# Patient Record
Sex: FEMALE | Race: WHITE | NOT HISPANIC OR LATINO | Employment: PART TIME | URBAN - NONMETROPOLITAN AREA
[De-identification: names, ages, dates, MRNs, and addresses within clinical notes are randomized per-mention and may not be internally consistent; named-entity substitution may affect disease eponyms.]

---

## 2018-10-04 ENCOUNTER — HOSPITAL ENCOUNTER (OUTPATIENT)
Facility: MEDICAL CENTER | Age: 28
End: 2018-10-04
Attending: NURSE PRACTITIONER
Payer: COMMERCIAL

## 2018-10-04 ENCOUNTER — OCCUPATIONAL MEDICINE (OUTPATIENT)
Dept: URGENT CARE | Facility: PHYSICIAN GROUP | Age: 28
End: 2018-10-04

## 2018-10-04 VITALS
TEMPERATURE: 97.2 F | WEIGHT: 207 LBS | HEART RATE: 64 BPM | OXYGEN SATURATION: 100 % | RESPIRATION RATE: 14 BRPM | DIASTOLIC BLOOD PRESSURE: 68 MMHG | HEIGHT: 62 IN | SYSTOLIC BLOOD PRESSURE: 108 MMHG | BODY MASS INDEX: 38.09 KG/M2

## 2018-10-04 DIAGNOSIS — Z02.89 ENCOUNTER FOR PHYSICAL EXAMINATION RELATED TO EMPLOYMENT: ICD-10-CM

## 2018-10-04 DIAGNOSIS — Z02.1 PRE-EMPLOYMENT DRUG SCREENING: ICD-10-CM

## 2018-10-04 LAB
AMP AMPHETAMINE: NORMAL
COC COCAINE: NORMAL
INT CON NEG: NORMAL
INT CON POS: NORMAL
MET METHAMPHETAMINES: NORMAL
OPI OPIATES: NORMAL
PCP PHENCYCLIDINE: NORMAL
POC DRUG COMMENT 753798-OCCUPATIONAL HEALTH: NORMAL
THC: NORMAL

## 2018-10-04 PROCEDURE — 80305 DRUG TEST PRSMV DIR OPT OBS: CPT | Performed by: NURSE PRACTITIONER

## 2018-10-04 PROCEDURE — 8915 PR COMPREHENSIVE PHYSICAL: Performed by: NURSE PRACTITIONER

## 2018-10-04 RX ORDER — VALACYCLOVIR HYDROCHLORIDE 500 MG/1
500 TABLET, FILM COATED ORAL 2 TIMES DAILY
COMMUNITY
End: 2020-09-18 | Stop reason: SDUPTHER

## 2018-10-04 NOTE — PROGRESS NOTES
"Subjective:      Christina Wooten is a 28 y.o. female who presents with Employment Physical (Px, UDS, TB Blood)            HPI    ROS  Pre employment physical     Objective:     /68 (BP Location: Right arm, Patient Position: Sitting, BP Cuff Size: Adult)   Pulse 64   Temp 36.2 °C (97.2 °F) (Temporal)   Resp 14   Ht 1.575 m (5' 2\")   Wt 93.9 kg (207 lb)   SpO2 100%   BMI 37.86 kg/m²      Physical Exam            Assessment/Plan:     1. Encounter for physical examination related to employment  Qu[employer]ON-TB Gold [TB TEST CELL IMM MEASURE AG]   2. Pre-employment drug screening  POCT 6 Panel Urine Drug Screen       "

## 2018-10-06 LAB
M TB TUBERC IFN-G BLD QL: NEGATIVE
M TB TUBERC IFN-G/MITOGEN IGNF BLD: -0
M TB TUBERC IGNF/MITOGEN IGNF CONTROL: 54.72 [IU]/ML
MITOGEN IGNF BCKGRD COR BLD-ACNC: 0.13 [IU]/ML

## 2020-09-18 ENCOUNTER — OFFICE VISIT (OUTPATIENT)
Dept: MEDICAL GROUP | Facility: PHYSICIAN GROUP | Age: 30
End: 2020-09-18
Payer: COMMERCIAL

## 2020-09-18 VITALS
HEIGHT: 62 IN | SYSTOLIC BLOOD PRESSURE: 110 MMHG | DIASTOLIC BLOOD PRESSURE: 76 MMHG | RESPIRATION RATE: 16 BRPM | BODY MASS INDEX: 42.65 KG/M2 | OXYGEN SATURATION: 95 % | TEMPERATURE: 97.5 F | WEIGHT: 231.8 LBS | HEART RATE: 98 BPM

## 2020-09-18 DIAGNOSIS — F41.9 ANXIETY: ICD-10-CM

## 2020-09-18 DIAGNOSIS — A60.00 GENITAL HERPES SIMPLEX, UNSPECIFIED SITE: ICD-10-CM

## 2020-09-18 DIAGNOSIS — E04.9 ENLARGED THYROID: ICD-10-CM

## 2020-09-18 DIAGNOSIS — J30.2 SEASONAL ALLERGIES: ICD-10-CM

## 2020-09-18 PROCEDURE — 99204 OFFICE O/P NEW MOD 45 MIN: CPT | Performed by: NURSE PRACTITIONER

## 2020-09-18 RX ORDER — PROPRANOLOL HYDROCHLORIDE 20 MG/1
20 TABLET ORAL 2 TIMES DAILY PRN
Qty: 30 TAB | Refills: 5 | Status: SHIPPED | OUTPATIENT
Start: 2020-09-18 | End: 2023-04-07

## 2020-09-18 RX ORDER — BUPROPION HYDROCHLORIDE 150 MG/1
150 TABLET ORAL EVERY MORNING
Qty: 30 TAB | Refills: 0 | Status: SHIPPED | OUTPATIENT
Start: 2020-09-18 | End: 2021-02-17

## 2020-09-18 RX ORDER — VALACYCLOVIR HYDROCHLORIDE 500 MG/1
TABLET, FILM COATED ORAL
Qty: 30 TAB | Refills: 1 | Status: SHIPPED | OUTPATIENT
Start: 2020-09-18 | End: 2021-02-17 | Stop reason: SDUPTHER

## 2020-09-18 RX ORDER — BUPROPION HYDROCHLORIDE 300 MG/1
300 TABLET ORAL EVERY MORNING
COMMUNITY
End: 2020-09-18

## 2020-09-18 SDOH — HEALTH STABILITY: MENTAL HEALTH: HOW OFTEN DO YOU HAVE 6 OR MORE DRINKS ON ONE OCCASION?: LESS THAN MONTHLY

## 2020-09-18 SDOH — HEALTH STABILITY: MENTAL HEALTH: HOW MANY STANDARD DRINKS CONTAINING ALCOHOL DO YOU HAVE ON A TYPICAL DAY?: 1 OR 2

## 2020-09-18 SDOH — HEALTH STABILITY: MENTAL HEALTH: HOW OFTEN DO YOU HAVE A DRINK CONTAINING ALCOHOL?: 2-4 TIMES A MONTH

## 2020-09-18 ASSESSMENT — PATIENT HEALTH QUESTIONNAIRE - PHQ9
5. POOR APPETITE OR OVEREATING: 2 - MORE THAN HALF THE DAYS
CLINICAL INTERPRETATION OF PHQ2 SCORE: 5
SUM OF ALL RESPONSES TO PHQ QUESTIONS 1-9: 16

## 2020-09-18 NOTE — PATIENT INSTRUCTIONS
Taper of buproprion    Start sertraline once a day    Take propranolol as needed 1/2 hour prior to anxiety provoking situation

## 2020-09-18 NOTE — ASSESSMENT & PLAN NOTE
Patient is 30-year-old female here to establish care with me today.  Reports seasonal allergies.  Takes Singulair daily.

## 2020-09-18 NOTE — ASSESSMENT & PLAN NOTE
Patient is 30-year-old female here to establish care with me today.  Last flare was 3 months ago.  Takes acyclovir 500 mg twice a day for flares.  Requesting refill today.

## 2020-09-18 NOTE — ASSESSMENT & PLAN NOTE
Patient is 30-year-old female here to establish care with me today.  Taking bupropion for anxiety, which has not helped.  Patient works at [employer] and find a psychiatrist there mention that bupropion is not for anxiety.  Patient would like to trial a different medication such as sertraline.  Tried Celexa many years ago, which did not help.  Also having anxiety episodes and presenting projects at school.  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  She is wondering about prescription for propranolol.  She has not taken this in the past.

## 2020-09-19 NOTE — ASSESSMENT & PLAN NOTE
Patient is 30-year-old female here to establish care with me today.  Enlarged thyroid noted on exam today.  Patient reports last thyroid labs were normal.  Has never had ultrasound of thyroid.  Family history of thyroid disease.

## 2020-09-19 NOTE — PROGRESS NOTES
CC: Establish care, medication for anxiety    HISTORY OF THE PRESENT ILLNESS: Patient is a 30 y.o. female. This pleasant patient is here today for evaluation and management of the following health problems.  Patient's previous primary care provider is Dr. Smith at [place].  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  Works full-time at [employer].      Seasonal allergies  Patient is 30-year-old female here to establish care with me today.  Reports seasonal allergies.  Takes Singulair daily.    Genital herpes  Patient is 30-year-old female here to establish care with me today.  Last flare was 3 months ago.  Takes acyclovir 500 mg twice a day for flares.  Requesting refill today.        Anxiety  Patient is 30-year-old female here to establish care with me today.  Taking bupropion for anxiety, which has not helped.  Patient works at [employer] and find a psychiatrist there mention that bupropion is not for anxiety.  Patient would like to trial a different medication such as sertraline.  Tried Celexa many years ago, which did not help.  Also having anxiety episodes and presenting projects at school.  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  She is wondering about prescription for propranolol.  She has not taken this in the past.    Enlarged thyroid  Patient is 30-year-old female here to establish care with me today.  Enlarged thyroid noted on exam today.  Patient reports last thyroid labs were normal.  Has never had ultrasound of thyroid.  Family history of thyroid disease.      Allergies: Vicodin [hydrocodone-acetaminophen]    Current Outpatient Medications Ordered in Epic   Medication Sig Dispense Refill   • valACYclovir (VALTREX) 500 MG Tab Take 1 tab twice a day for 3 days if needed for herpes flare. 30 Tab 1   • sertraline (ZOLOFT) 50 MG Tab Take 1 Tab by mouth every day. 30 Tab 2   • propranolol (INDERAL) 20 MG Tab Take 1 Tab by mouth 2 times a day as needed. 30 Tab 5   •  buPROPion (WELLBUTRIN XL) 150 MG XL tablet Take 1 Tab by mouth every morning. 30 Tab 0   • levonorgestrel (MIRENA, 52 MG,) 20 MCG/24HR IUD 1 Each by Intrauterine route Once.     • MONTELUKAST SODIUM PO Take  by mouth.       No current Epic-ordered facility-administered medications on file.        Past Medical History:   Diagnosis Date   • Allergy    • Anxiety    • Depression    • GERD (gastroesophageal reflux disease)    • Head ache    • Urinary tract infection        Past Surgical History:   Procedure Laterality Date   • DENTAL EXTRACTION(S)         Social History     Tobacco Use   • Smoking status: Never Smoker   • Smokeless tobacco: Never Used   Substance Use Topics   • Alcohol use: Yes     Frequency: 2-4 times a month     Drinks per session: 1 or 2     Binge frequency: Less than monthly   • Drug use: Never       Family History   Problem Relation Age of Onset   • Arthritis Mother    • Cancer Mother         uterine   • Heart Disease Mother    • Hypertension Mother    • Drug abuse Mother    • Alcohol abuse Father    • Scoliosis Sister    • Thyroid Maternal Grandmother    • Thyroid Sister         hashimoto       ROS:     - Constitutional: Negative for fever, chills, unexpected weight change.  Positive fatigue/generalized weakness.     - HEENT: Negative for vision changes, hearing changes, ear pain, rhinorrhea, sinus congestion, and sore throat.   Positive headaches    - Respiratory: Negative for cough, dyspnea, and wheezing.      - Cardiovascular: Negative for chest pain, orthopnea, and bilateral lower extremity edema.  Positive palpitations    - Gastrointestinal: Negative for nausea, vomiting, abdominal pain, hematochezia, melena, constipation. Positive heartburn and diarrhea.    - Genitourinary: Negative for dysuria, polyuria, hematuria, pyuria, urinary urgency, and urinary incontinence.      - Musculoskeletal: Negative for myalgias.  Positive back and joint pain.    - Skin: Negative for rash, itching, cyanotic  "skin color change.     - Neurological: Negative for dizziness, tingling, tremors, focal sensory deficit, focal weakness and headaches.     - Endo/Heme/Allergies: Positive bruise/bleed easily.     - Psychiatric/Behavioral: As in HPI, otherwise negative for  suicidal/homicidal ideation and memory loss.           Exam: /76   Pulse 98   Temp 36.4 °C (97.5 °F) (Temporal)   Resp 16   Ht 1.575 m (5' 2\")   Wt 105.1 kg (231 lb 12.8 oz)   SpO2 95%  Body mass index is 42.4 kg/m².    General: Alert, pleasant, obese habitus, well nourished, well developed female in NAD  HEENT: Normocephalic. Eyes conjunctiva clear lids without ptosis, pupils equal and reactive to light, ears normal shape and contour, canals are clear bilaterally, tympanic membranes are pearly gray with good light reflex, nasal mucosa without erythema and drainage, oropharynx is without erythema, edema or exudates.   Neck: Supple without bruit. Thyroid is enlarged.  Pulmonary: Clear to ausculation.  Normal effort. No rales, ronchi, or wheezing.  Cardiovascular: Normal rate and rhythm without murmur. Carotid and radial pulses are intact and equal bilaterally.  No lower extremity edema.  Abdomen: Soft, nontender, nondistended. Normal bowel sounds. Liver and spleen are not palpable  Neurologic: Grossly nonfocal  Lymph: No cervical or supraclavicular lymph nodes are palpable  Skin: Warm and dry.  No obvious lesions.  Musculoskeletal: Normal gait.   Psych: Normal mood and affect. Alert and oriented. Judgment and insight is normal.    Please note that this dictation was created using voice recognition software. I have made every reasonable attempt to correct obvious errors, but I expect that there are errors of grammar and possibly content that I did not discover before finalizing the note.      Assessment/Plan  1. Seasonal allergies  Well-controlled.  Continue Singulair.  Not needing refill at this time.    2. Genital herpes simplex, unspecified site  Has " 1-2 flares a year, relieved with valacyclovir.  Patient requesting refill today.  - valACYclovir (VALTREX) 500 MG Tab; Take 1 tab twice a day for 3 days if needed for herpes flare.  Dispense: 30 Tab; Refill: 1    3. Anxiety  Anxiety not controlled with bupropion, as best we can actually cause worsening anxiety.  Will taper off bupropion and start sertraline.  We reviewed that medication takes 4 to 6 weeks to reach full effect, and that side effects of headache and nausea are temporary lasting about 2 weeks.    Will prescribe propranolol to take before anxiety provoking situations.  Advised on routine aerobic exercise.    - sertraline (ZOLOFT) 50 MG Tab; Take 1 Tab by mouth every day.  Dispense: 30 Tab; Refill: 2  - propranolol (INDERAL) 20 MG Tab; Take 1 Tab by mouth 2 times a day as needed.  Dispense: 30 Tab; Refill: 5  - buPROPion (WELLBUTRIN XL) 150 MG XL tablet; Take 1 Tab by mouth every morning.  Dispense: 30 Tab; Refill: 0    4. Enlarged thyroid  We will get lab results from [facility].  Consider repeating TSH and adding TPO and free T4.  Consider ultrasound.    We will request lab results from [facility].  Patient will return to clinic in 6 weeks for follow-up on depression.

## 2020-10-23 ENCOUNTER — TELEPHONE (OUTPATIENT)
Dept: MEDICAL GROUP | Facility: PHYSICIAN GROUP | Age: 30
End: 2020-10-23

## 2020-10-23 DIAGNOSIS — J30.2 SEASONAL ALLERGIES: ICD-10-CM

## 2020-10-23 NOTE — TELEPHONE ENCOUNTER
[facility] patient is request a refill for montelukast which has not been written by any providers at this office. Will forward

## 2020-10-25 RX ORDER — MONTELUKAST SODIUM 10 MG/1
10 TABLET ORAL DAILY
Qty: 90 TAB | Refills: 3 | Status: SHIPPED | OUTPATIENT
Start: 2020-10-25 | End: 2021-08-10 | Stop reason: SDUPTHER

## 2020-11-10 ENCOUNTER — APPOINTMENT (OUTPATIENT)
Dept: URGENT CARE | Facility: PHYSICIAN GROUP | Age: 30
End: 2020-11-10

## 2020-12-24 DIAGNOSIS — F41.9 ANXIETY: ICD-10-CM

## 2021-01-06 ENCOUNTER — HOSPITAL ENCOUNTER (OUTPATIENT)
Dept: LAB | Facility: MEDICAL CENTER | Age: 31
End: 2021-01-06
Attending: NURSE PRACTITIONER
Payer: COMMERCIAL

## 2021-01-06 ENCOUNTER — OFFICE VISIT (OUTPATIENT)
Dept: MEDICAL GROUP | Facility: PHYSICIAN GROUP | Age: 31
End: 2021-01-06
Payer: COMMERCIAL

## 2021-01-06 VITALS
BODY MASS INDEX: 45.08 KG/M2 | DIASTOLIC BLOOD PRESSURE: 78 MMHG | RESPIRATION RATE: 18 BRPM | HEART RATE: 96 BPM | WEIGHT: 245 LBS | TEMPERATURE: 98.2 F | SYSTOLIC BLOOD PRESSURE: 112 MMHG | HEIGHT: 62 IN | OXYGEN SATURATION: 95 %

## 2021-01-06 DIAGNOSIS — F33.1 MODERATE EPISODE OF RECURRENT MAJOR DEPRESSIVE DISORDER (HCC): ICD-10-CM

## 2021-01-06 DIAGNOSIS — Z13.6 SCREENING FOR CARDIOVASCULAR CONDITION: ICD-10-CM

## 2021-01-06 DIAGNOSIS — E04.9 ENLARGED THYROID: ICD-10-CM

## 2021-01-06 DIAGNOSIS — G56.03 BILATERAL CARPAL TUNNEL SYNDROME: ICD-10-CM

## 2021-01-06 DIAGNOSIS — F41.9 ANXIETY: ICD-10-CM

## 2021-01-06 DIAGNOSIS — E66.01 MORBID OBESITY WITH BMI OF 40.0-44.9, ADULT (HCC): ICD-10-CM

## 2021-01-06 PROBLEM — F33.9 EPISODE OF RECURRENT MAJOR DEPRESSIVE DISORDER (HCC): Status: ACTIVE | Noted: 2021-01-06

## 2021-01-06 LAB
ALBUMIN SERPL BCP-MCNC: 4.4 G/DL (ref 3.2–4.9)
ALBUMIN/GLOB SERPL: 1.6 G/DL
ALP SERPL-CCNC: 67 U/L (ref 30–99)
ALT SERPL-CCNC: 21 U/L (ref 2–50)
ANION GAP SERPL CALC-SCNC: 9 MMOL/L (ref 7–16)
AST SERPL-CCNC: 18 U/L (ref 12–45)
BILIRUB SERPL-MCNC: 0.4 MG/DL (ref 0.1–1.5)
BUN SERPL-MCNC: 11 MG/DL (ref 8–22)
CALCIUM SERPL-MCNC: 9.4 MG/DL (ref 8.5–10.5)
CHLORIDE SERPL-SCNC: 103 MMOL/L (ref 96–112)
CO2 SERPL-SCNC: 25 MMOL/L (ref 20–33)
CREAT SERPL-MCNC: 0.74 MG/DL (ref 0.5–1.4)
FASTING STATUS PATIENT QL REPORTED: NORMAL
GLOBULIN SER CALC-MCNC: 2.7 G/DL (ref 1.9–3.5)
GLUCOSE SERPL-MCNC: 89 MG/DL (ref 65–99)
POTASSIUM SERPL-SCNC: 4.1 MMOL/L (ref 3.6–5.5)
PROT SERPL-MCNC: 7.1 G/DL (ref 6–8.2)
SODIUM SERPL-SCNC: 137 MMOL/L (ref 135–145)
T4 FREE SERPL-MCNC: 0.96 NG/DL (ref 0.93–1.7)
THYROPEROXIDASE AB SERPL-ACNC: <9 IU/ML (ref 0–9)
TSH SERPL DL<=0.005 MIU/L-ACNC: 3.79 UIU/ML (ref 0.38–5.33)

## 2021-01-06 PROCEDURE — 84439 ASSAY OF FREE THYROXINE: CPT

## 2021-01-06 PROCEDURE — 99214 OFFICE O/P EST MOD 30 MIN: CPT | Performed by: NURSE PRACTITIONER

## 2021-01-06 PROCEDURE — 36415 COLL VENOUS BLD VENIPUNCTURE: CPT

## 2021-01-06 PROCEDURE — 84443 ASSAY THYROID STIM HORMONE: CPT

## 2021-01-06 PROCEDURE — 80053 COMPREHEN METABOLIC PANEL: CPT

## 2021-01-06 PROCEDURE — 86376 MICROSOMAL ANTIBODY EACH: CPT

## 2021-01-06 RX ORDER — SERTRALINE HYDROCHLORIDE 100 MG/1
100 TABLET, FILM COATED ORAL DAILY
Qty: 90 TAB | Refills: 1 | Status: SHIPPED | OUTPATIENT
Start: 2021-01-06 | End: 2021-12-03

## 2021-01-06 NOTE — PROGRESS NOTES
CC: Depression, anxiety, hand numbness, weight management    HISTORY OF THE PRESENT ILLNESS: Patient is a 30 y.o. female. This pleasant patient is here today for evaluation and management of the following health problems.    Health Maintenance: due      Morbid obesity with BMI of 40.0-44.9, adult ([facility])  Patient reports she has been struggling with weight for most of her adult life.  She did lose 70 pounds in the past, but has gained most of it back.  She is been trying to use the same regimen she did when she lost the 70 pounds, but cannot seem to lose the weight.  She does admit that she was trying during the summer, but not recently.  Was doing weightlifting at the gym with just a little bit of cardio.  Trying to follow Cassie diet.  Reports phentermine is helped in the past, but caused racing heart.    Enlarged thyroid  Patient continues to have enlarged thyroid on exam.  TSH from 4/22/2020 is normal at 2.06.  Scanned into chart.  Free T4 and TPO was not done.  Denies difficulty swallowing or hoarse voice.    Episode of recurrent major depressive disorder (HCC)  Patient started on sertraline 3 months ago.  Reports it has helped with her anxiety, but notices her depression becoming more prominent.  No longer taking Wellbutrin as this was causing increased anxiety.  PHQ-9 from last appointment is 16.  She is finding she has a lot of downtime at the moment and is struggling with finances.  Her fiancé is gone a lot of the time as he is a long-distance .  She is currently in nursing school.  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  She is finding herself feeling very unmotivated, tearful.  Stress eating.  Denies SI/HI.    Bilateral carpal tunnel syndrome  Patient reports worsening hand numbness and tingling.  Wakes her at night.  Occurs occasionally during the day.  Has tried compression sleeves, but ends up taking them off in the middle of the night.   Denies pain, erythema, injury.    Anxiety  Chronic health problem, improved control.  Now taking sertraline 50 mg daily.  Tapered off of Wellbutrin.  Does take propranolol as needed for social anxiety.  Has noticed worsening depression, please see additional notes.      Allergies: Vicodin [hydrocodone-acetaminophen]    Current Outpatient Medications Ordered in Epic   Medication Sig Dispense Refill   • sertraline (ZOLOFT) 100 MG Tab Take 1 Tab by mouth every day. 90 Tab 1   • montelukast (SINGULAIR) 10 MG Tab Take 1 Tab by mouth every day. 90 Tab 3   • valACYclovir (VALTREX) 500 MG Tab Take 1 tab twice a day for 3 days if needed for herpes flare. 30 Tab 1   • propranolol (INDERAL) 20 MG Tab Take 1 Tab by mouth 2 times a day as needed. 30 Tab 5   • levonorgestrel (MIRENA, 52 MG,) 20 MCG/24HR IUD 1 Each by Intrauterine route Once.     • buPROPion (WELLBUTRIN XL) 150 MG XL tablet Take 1 Tab by mouth every morning. (Patient not taking: Reported on 1/6/2021) 30 Tab 0     No current Epic-ordered facility-administered medications on file.        Past Medical History:   Diagnosis Date   • Allergy    • Anxiety    • Depression    • GERD (gastroesophageal reflux disease)    • Head ache    • Urinary tract infection        Past Surgical History:   Procedure Laterality Date   • DENTAL EXTRACTION(S)         Social History     Tobacco Use   • Smoking status: Never Smoker   • Smokeless tobacco: Never Used   Substance Use Topics   • Alcohol use: Yes     Frequency: 2-4 times a month     Drinks per session: 1 or 2     Binge frequency: Less than monthly   • Drug use: Never       Family History   Problem Relation Age of Onset   • Arthritis Mother    • Cancer Mother         uterine   • Heart Disease Mother    • Hypertension Mother    • Drug abuse Mother    • Alcohol abuse Father    • Scoliosis Sister    • Thyroid Maternal Grandmother    • Thyroid Sister         hashimoto       ROS:   As in HPI, otherwise negative for chest pain, dyspnea,  "abdominal pain, dysuria, blood in stool, fever          Exam: /78 (BP Location: Left arm, Patient Position: Sitting, BP Cuff Size: Adult)   Pulse 96   Temp 36.8 °C (98.2 °F) (Temporal)   Resp 18   Ht 1.575 m (5' 2\")   Wt 111.1 kg (245 lb)   SpO2 95%  Body mass index is 44.81 kg/m².    General: Alert, pleasant, obese habitus, well nourished, well developed female in NAD  Neck: Supple without bruit. Thyroid is enlarged.  Pulmonary: Clear to ausculation.  Normal effort. No rales, ronchi, or wheezing.  Cardiovascular: Normal rate and rhythm without murmur.   Lymph: No cervical or supraclavicular lymph nodes are palpable  Bilateral hands: no erythema or edema, positive Phalen's, radial pulses palpable and equal bilaterally  Skin: Warm and dry.    Musculoskeletal: Normal gait.   Psych: Normal mood and affect. Alert and oriented. Judgment and insight is normal.    Please note that this dictation was created using voice recognition software. I have made every reasonable attempt to correct obvious errors, but I expect that there are errors of grammar and possibly content that I did not discover before finalizing the note.      Assessment/Plan  1. Anxiety  Improved control.  Will increase sertraline to 100 mg daily.  Continue with propranolol as needed.  Reviewed lifestyle measures including routine aerobic exercise.  - sertraline (ZOLOFT) 100 MG Tab; Take 1 Tab by mouth every day.  Dispense: 90 Tab; Refill: 1    2. Enlarged thyroid  Enlarged thyroid noted on exam today.  Will get TSH in addition to free T4 and TPO.  Consider ultrasound.  - TSH; Future  - FREE THYROXINE; Future  - THYROID PEROXIDASE  (TPO) AB; Future    3. Morbid obesity with BMI of 40.0-44.9, adult (HCC)  Patient continues to struggle with obesity.  Did discuss prescribing phentermine, but patient at side effect tachycardia.  Reviewed diet plan such as weight watchers.  Encouraged routine aerobic exercise as tolerated.  Did discuss referral to " medical weight management for further evaluation and treatment.  Patient would like referral.  - [facility] (HIP)  - Patient identified as having weight management issue.  Appropriate orders and counseling given.    4. Screening for cardiovascular condition  We will review lab results with patient at next appointment.  - Comp Metabolic Panel; Future  - ESTIMATED GFR; Future    5. Moderate episode of recurrent major depressive disorder (HCC)  Patient having worsening depression.  Will increase sertraline to 100 mg daily.  Reviewed lifestyle measures including routine aerobic exercise as tolerated.  Reviewed that it can take up to 6 weeks for increased dose to take therapeutic effect.  - sertraline (ZOLOFT) 100 MG Tab; Take 1 Tab by mouth every day.  Dispense: 90 Tab; Refill: 1    6. Bilateral carpal tunnel syndrome  Symptomatic, positive Phalen's, waking patient at night.  Reviewed options including physical therapy, wrist braces at night, referral to orthopedics.  Patient would like to trial wrist brace since.  Hoping that weight loss will also help.    Patient will return to clinic in 6 weeks for follow-up on depression and carpal tunnel syndrome.  She will return to clinic in 10 weeks for routine screening Pap.

## 2021-01-06 NOTE — ASSESSMENT & PLAN NOTE
Propranolol as needed working well  Anxiety is improving  Depression is getting worse  School, finances, fiance gone a lot   Stop new [place] over a month ago  Unmotivated, tearful, hungry, stress eating  PH 16

## 2021-01-07 NOTE — ASSESSMENT & PLAN NOTE
Chronic health problem, improved control.  Now taking sertraline 50 mg daily.  Tapered off of Wellbutrin.  Does take propranolol as needed for social anxiety.  Has noticed worsening depression, please see additional notes.

## 2021-01-07 NOTE — ASSESSMENT & PLAN NOTE
Patient continues to have enlarged thyroid on exam.  TSH from 4/22/2020 is normal at 2.06.  Scanned into chart.  Free T4 and TPO was not done.  Denies difficulty swallowing or hoarse voice.

## 2021-01-07 NOTE — ASSESSMENT & PLAN NOTE
Patient reports worsening hand numbness and tingling.  Wakes her at night.  Occurs occasionally during the day.  Has tried compression sleeves, but ends up taking them off in the middle of the night.  Denies pain, erythema, injury.

## 2021-01-07 NOTE — ASSESSMENT & PLAN NOTE
Patient started on sertraline 3 months ago.  Reports it has helped with her anxiety, but notices her depression becoming more prominent.  No longer taking Wellbutrin as this was causing increased anxiety.  PHQ-9 from last appointment is 16.  She is finding she has a lot of downtime at the moment and is struggling with finances.  Her fiancé is gone a lot of the time as he is a long-distance .  She is currently in nursing school.  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  She is finding herself feeling very unmotivated, tearful.  Stress eating.  Denies SI/HI.

## 2021-01-07 NOTE — ASSESSMENT & PLAN NOTE
Patient reports she has been struggling with weight for most of her adult life.  She did lose 70 pounds in the past, but has gained most of it back.  She is been trying to use the same regimen she did when she lost the 70 pounds, but cannot seem to lose the weight.  She does admit that she was trying during the summer, but not recently.  Was doing weightlifting at the gym with just a little bit of cardio.  Trying to follow [place] diet.  Reports phentermine is helped in the past, but caused racing heart.

## 2021-02-17 ENCOUNTER — TELEMEDICINE (OUTPATIENT)
Dept: MEDICAL GROUP | Facility: PHYSICIAN GROUP | Age: 31
End: 2021-02-17
Payer: COMMERCIAL

## 2021-02-17 VITALS — BODY MASS INDEX: 44.16 KG/M2 | WEIGHT: 240 LBS | HEIGHT: 62 IN

## 2021-02-17 DIAGNOSIS — F33.1 MODERATE EPISODE OF RECURRENT MAJOR DEPRESSIVE DISORDER (HCC): ICD-10-CM

## 2021-02-17 DIAGNOSIS — A60.00 GENITAL HERPES SIMPLEX, UNSPECIFIED SITE: ICD-10-CM

## 2021-02-17 DIAGNOSIS — E04.9 ENLARGED THYROID: ICD-10-CM

## 2021-02-17 DIAGNOSIS — F41.9 ANXIETY: ICD-10-CM

## 2021-02-17 DIAGNOSIS — E66.01 MORBID OBESITY WITH BMI OF 40.0-44.9, ADULT (HCC): ICD-10-CM

## 2021-02-17 PROCEDURE — 99214 OFFICE O/P EST MOD 30 MIN: CPT | Mod: 95,CR | Performed by: NURSE PRACTITIONER

## 2021-02-17 RX ORDER — VALACYCLOVIR HYDROCHLORIDE 500 MG/1
TABLET, FILM COATED ORAL
Qty: 30 TABLET | Refills: 1 | Status: SHIPPED | OUTPATIENT
Start: 2021-02-17 | End: 2021-08-10 | Stop reason: SDUPTHER

## 2021-02-17 NOTE — ASSESSMENT & PLAN NOTE
As a means of avoiding spread of COVID-19, this visit is being conducted by video.  This is a chronic health problem that is well controlled with current medications and lifestyle measures.  Taking sertraline 100 mg daily.  Also takes propranolol as needed for anxiety.  Finds that she takes it in the morning, which helps her throughout the day.

## 2021-02-17 NOTE — ASSESSMENT & PLAN NOTE
As a means of avoiding spread of COVID-19, this visit is being conducted by video.  Patient found to have enlarged thyroid at last appointment.  Thyroid panel is normal.  Patient denies worsening enlargement, unilateral enlargement, difficulty swallowing or hoarse voice.    Component      Latest Ref Rng & Units 1/6/2021   Microsomal -Tpo- Abs      0.0 - 9.0 IU/mL <9.0   Free T-4      0.93 - 1.70 ng/dL 0.96   TSH      0.380 - 5.330 uIU/mL 3.790

## 2021-02-17 NOTE — PROGRESS NOTES
Virtual Visit: Established Patient   This visit was conducted via Zoom using secure and encrypted videoconferencing technology. The patient was in a private location in the state of [place].    The patient's identity was confirmed and verbal consent was obtained for this virtual visit.    Subjective:   CC:   Chief Complaint   Patient presents with   • Depression     FV       Christina Wooten is a 30 y.o. female presenting for evaluation and management of:    Episode of recurrent major depressive disorder (HCC)  As a means of avoiding spread of COVID-19, this visit is being conducted by video.  Chronic health problem, improved control with increase in sertraline dose.  Now taking sertraline 100 mg daily.  Denies any adverse effects.  Reports feeling much happier, more motivated.  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  Also has a new work study job at the [employer].  Denies SI/HI.        Morbid obesity with BMI of 40.0-44.9, adult (HCC)  As a means of avoiding spread of COVID-19, this visit is being conducted by video.  Referred to medical weight management at last appointment.  Has orientation with medical weight management today.    Anxiety  As a means of avoiding spread of COVID-19, this visit is being conducted by video.  This is a chronic health problem that is well controlled with current medications and lifestyle measures.  Taking sertraline 100 mg daily.  Also takes propranolol as needed for anxiety.  Finds that she takes it in the morning, which helps her throughout the day.    Enlarged thyroid  As a means of avoiding spread of COVID-19, this visit is being conducted by video.  Patient found to have enlarged thyroid at last appointment.  Thyroid panel is normal.  Patient denies worsening enlargement, unilateral enlargement, difficulty swallowing or hoarse voice.    Component      Latest Ref Rng & Units 1/6/2021   Microsomal -Tpo- Abs      0.0 - 9.0 IU/mL <9.0   Free T-4      0.93 - 1.70 ng/dL  "0.96   TSH      0.380 - 5.330 uIU/mL 3.790       ROS   Denies any recent fevers or chills. No nausea or vomiting. No chest pains or shortness of breath.     Allergies   Allergen Reactions   • Vicodin [Hydrocodone-Acetaminophen]        Current medicines (including changes today)  Current Outpatient Medications   Medication Sig Dispense Refill   • valACYclovir (VALTREX) 500 MG Tab Take 1 tab twice a day for 3 days if needed for herpes flare. 30 tablet 1   • sertraline (ZOLOFT) 100 MG Tab Take 1 Tab by mouth every day. 90 Tab 1   • montelukast (SINGULAIR) 10 MG Tab Take 1 Tab by mouth every day. 90 Tab 3   • propranolol (INDERAL) 20 MG Tab Take 1 Tab by mouth 2 times a day as needed. 30 Tab 5   • levonorgestrel (MIRENA, 52 MG,) 20 MCG/24HR IUD 1 Each by Intrauterine route Once.       No current facility-administered medications for this visit.       Patient Active Problem List    Diagnosis Date Noted   • Morbid obesity with BMI of 40.0-44.9, adult ([facility]) 01/06/2021   • Episode of recurrent major depressive disorder ([facility]) 01/06/2021   • Bilateral carpal tunnel syndrome 01/06/2021   • Seasonal allergies 09/18/2020   • Genital herpes 09/18/2020   • Anxiety 09/18/2020   • Enlarged thyroid 09/18/2020       Family History   Problem Relation Age of Onset   • Arthritis Mother    • Cancer Mother         uterine   • Heart Disease Mother    • Hypertension Mother    • Drug abuse Mother    • Alcohol abuse Father    • Scoliosis Sister    • Thyroid Maternal Grandmother    • Thyroid Sister         hashimoto       She  has a past medical history of Allergy, Anxiety, Depression, GERD (gastroesophageal reflux disease), Head ache, and Urinary tract infection.  She  has a past surgical history that includes dental extraction(s).       Objective:   Ht 1.575 m (5' 2\")   Wt 109 kg (240 lb)   BMI 43.90 kg/m²     Physical Exam:  Constitutional: Alert, no distress, well-groomed.  Skin: No rashes in visible areas.  Eye: Round. Conjunctiva clear, " lids normal. No icterus.   ENMT: Lips pink without lesions, good dentition, moist mucous membranes. Phonation normal.  Neck: No masses, mild thyroidmegaly. Moves freely without pain.  Respiratory: Unlabored respiratory effort, no cough or audible wheeze  Psych: Alert and oriented x3, normal affect and mood.     Component      Latest Ref Rng & Units 1/6/2021   Sodium      135 - 145 mmol/L 137   Potassium      3.6 - 5.5 mmol/L 4.1   Chloride      96 - 112 mmol/L 103   Co2      20 - 33 mmol/L 25   Anion Gap      7.0 - 16.0 9.0   Glucose      65 - 99 mg/dL 89   Bun      8 - 22 mg/dL 11   Creatinine      0.50 - 1.40 mg/dL 0.74   Calcium      8.5 - 10.5 mg/dL 9.4   AST(SGOT)      12 - 45 U/L 18   ALT(SGPT)      2 - 50 U/L 21   Alkaline Phosphatase      30 - 99 U/L 67   Total Bilirubin      0.1 - 1.5 mg/dL 0.4   Albumin      3.2 - 4.9 g/dL 4.4   Total Protein      6.0 - 8.2 g/dL 7.1   Globulin      1.9 - 3.5 g/dL 2.7   A-G Ratio      g/dL 1.6   GFR If African American      >60 mL/min/1.73 m 2 >60   GFR If Non African American      >60 mL/min/1.73 m 2 >60       Assessment and Plan:   The following treatment plan was discussed:     1. Moderate episode of recurrent major depressive disorder (HCC)  Improved control.  Continue with Zoloft 100 mg daily.  Advised on lifestyle measures including routine aerobic exercise as tolerated.    2. Genital herpes simplex, unspecified site  Patient requesting refill.  Did discuss that if she is having frequent flares to consider maintenance dose.  - valACYclovir (VALTREX) 500 MG Tab; Take 1 tab twice a day for 3 days if needed for herpes flare.  Dispense: 30 tablet; Refill: 1    3. Morbid obesity with BMI of 40.0-44.9, adult (HCC)  Continue with plan at attending orientation for medical weight management today.    4. Anxiety  Well-controlled.  Continue with Zoloft routinely and propranolol as needed.    5. Enlarged thyroid  Asymptomatic.  Thyroid panel normal.  Discussed option of getting  thyroid ultrasound or watchful waiting.  Patient would like to opt for watchful waiting.  She will notify me if she develops unilateral enlargement, tenderness, difficult swallowing.      Follow-up: Patient will return to clinic as previously scheduled for screening Pap in 1 month.

## 2021-02-17 NOTE — ASSESSMENT & PLAN NOTE
As a means of avoiding spread of COVID-19, this visit is being conducted by video.  Chronic health problem, improved control with increase in sertraline dose.  Now taking sertraline 100 mg daily.  Denies any adverse effects.  Reports feeling much happier, more motivated.  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  Also has a new work study job at the [employer].  Denies SI/HI.

## 2021-05-20 ENCOUNTER — OFFICE VISIT (OUTPATIENT)
Dept: MEDICAL GROUP | Facility: PHYSICIAN GROUP | Age: 31
End: 2021-05-20
Payer: COMMERCIAL

## 2021-05-20 ENCOUNTER — HOSPITAL ENCOUNTER (OUTPATIENT)
Facility: MEDICAL CENTER | Age: 31
End: 2021-05-20
Attending: NURSE PRACTITIONER
Payer: COMMERCIAL

## 2021-05-20 VITALS
SYSTOLIC BLOOD PRESSURE: 120 MMHG | RESPIRATION RATE: 16 BRPM | OXYGEN SATURATION: 97 % | BODY MASS INDEX: 45.27 KG/M2 | DIASTOLIC BLOOD PRESSURE: 78 MMHG | HEIGHT: 62 IN | HEART RATE: 101 BPM | TEMPERATURE: 97.6 F | WEIGHT: 246 LBS

## 2021-05-20 DIAGNOSIS — Z01.419 ENCOUNTER FOR GYNECOLOGICAL EXAMINATION: ICD-10-CM

## 2021-05-20 DIAGNOSIS — Z12.4 SCREENING FOR CERVICAL CANCER: ICD-10-CM

## 2021-05-20 PROCEDURE — 87624 HPV HI-RISK TYP POOLED RSLT: CPT

## 2021-05-20 PROCEDURE — 88175 CYTOPATH C/V AUTO FLUID REDO: CPT

## 2021-05-20 PROCEDURE — 99000 SPECIMEN HANDLING OFFICE-LAB: CPT | Performed by: NURSE PRACTITIONER

## 2021-05-20 PROCEDURE — 99395 PREV VISIT EST AGE 18-39: CPT | Performed by: NURSE PRACTITIONER

## 2021-05-20 NOTE — PROGRESS NOTES
SUBJECTIVE: 30 y.o. female for annual routine gynecologic exam  Chief Complaint   Patient presents with   • Gynecologic Exam       OB History   No obstetric history on file.      Last Pap: 10/2018  Social History     Substance and Sexual Activity   Sexual Activity Yes   • Partners: Male   • Birth control/protection: I.U.D.    Comment: mirena iud in 2018     H/O Abnormal Pap no  She  reports that she has never smoked. She has never used smokeless tobacco.     LMP a week ago      Allergies: Vicodin [hydrocodone-acetaminophen]     ROS:    Menses every month with 2 days spotting bleeding. Cramping is mild.   She does not take OTC analgesics for cramping  Reports no menopause symptoms of hot flashes, night sweats, sleep disruption, mood changes, vaginal dryness.   No significant bloating/fluid retention, pelvic pain, or dyspareunia. No vaginal discharge   No breast tenderness, mass, nipple discharge, changes in size or contour, or abnormal cyclic discomfort.  No urinary tract symptoms, no incontinence.   No abdominal pain, change in bowel habits, black or bloody stools.    No unusual headaches, no visual changes, menstrual migraines   No prolonged cough. No dyspnea or chest pain on exertion.  No new/concerning skin lesions, concerns.     Exercise: no regular exercise program  Preventive Care:  Health Maintenance Topics with due status: Overdue       Topic Date Due    PAP SMEAR Never done       Current medicines (including changes today)  Current Outpatient Medications   Medication Sig Dispense Refill   • valACYclovir (VALTREX) 500 MG Tab Take 1 tab twice a day for 3 days if needed for herpes flare. 30 tablet 1   • sertraline (ZOLOFT) 100 MG Tab Take 1 Tab by mouth every day. 90 Tab 1   • montelukast (SINGULAIR) 10 MG Tab Take 1 Tab by mouth every day. 90 Tab 3   • propranolol (INDERAL) 20 MG Tab Take 1 Tab by mouth 2 times a day as needed. 30 Tab 5   • levonorgestrel (MIRENA, 52 MG,) 20 MCG/24HR IUD 1 Each by  "Intrauterine route Once.       No current facility-administered medications for this visit.     She  has a past medical history of Allergy, Anxiety, Depression, GERD (gastroesophageal reflux disease), Head ache, and Urinary tract infection.  She  has a past surgical history that includes dental extraction(s).     Family History:   Family History   Problem Relation Age of Onset   • Arthritis Mother    • Cancer Mother         uterine   • Heart Disease Mother    • Hypertension Mother    • Drug abuse Mother    • Alcohol abuse Father    • Scoliosis Sister    • Thyroid Maternal Grandmother    • Thyroid Sister         hashimoto          OBJECTIVE:   /78 (BP Location: Left arm, Patient Position: Sitting, BP Cuff Size: Adult long)   Pulse (!) 101   Temp 36.4 °C (97.6 °F) (Temporal)   Resp 16   Ht 1.575 m (5' 2\")   Wt 112 kg (246 lb) Comment: with shoes  SpO2 97%   BMI 44.99 kg/m²   Body mass index is 44.99 kg/m².    HEAD AND NECK:  Ears normal.  Throat, oral cavity and tongue normal.  Neck supple. No adenopathy or masses in the neck or supraclavicular regions.  No carotid bruits. No thyromegaly. NEURO: Cranial nerves are normal. CHEST:  Clear, good air entry, no wheezes or rales. HEART:  Regular rate and rhythm.   ABDOMEN:  Soft without tenderness, guarding, mass or organomegaly. SKIN: color normal, vascularity normal, no edema, temperature normal   No rashes or suspicious skin lesions noted.     Breast Exam: Performed with instruction during examination. No axillary lymphadenopathy, no skin changes, no dominant masses. No nipple retraction  Pelvic Exam -  Normal external genitalia with no lesions. Normal vaginal mucosa with normal rugation and scant discharge. IUD string visible/short. Cervix with no visible lesions. No cervical motion tenderness. Uterus is normal sized with no masses. No adnexal tenderness or enlargement appreciated. Sure Path Pap obtained, and specimen(s) sent to lab  A chaperone was offered " to the patient during today's exam. Patient declined chaperone.      <ASSESSMENT and PLAN>  1. Screening for cervical cancer  No abnormalities found on exam today.  Will send specimen for cytology.  - THINPREP PAP WITH HPV; Future    2. Encounter for gynecological examination  As in #1  Discussed:  Follow-up in 5 years for next Gyn exam and in 5 years for next Pap smear, pending all laboratory results are normal.     Patient reports she and her  are planning on starting a family in January 2022.  She will need IUD removed prior to January.  Did discuss starting referral process right now.  Patient declined and wants to discuss at next appointment.    Next office visit for recheck of chronic medical conditions is due in next week as previously scheduled.

## 2021-05-21 DIAGNOSIS — Z12.4 SCREENING FOR CERVICAL CANCER: ICD-10-CM

## 2021-05-21 LAB
CYTOLOGY REG CYTOL: ABNORMAL
HPV HR 12 DNA CVX QL NAA+PROBE: POSITIVE
HPV16 DNA SPEC QL NAA+PROBE: NEGATIVE
HPV18 DNA SPEC QL NAA+PROBE: NEGATIVE
SPECIMEN SOURCE: ABNORMAL

## 2021-08-10 ENCOUNTER — HOSPITAL ENCOUNTER (OUTPATIENT)
Facility: MEDICAL CENTER | Age: 31
End: 2021-08-10
Attending: NURSE PRACTITIONER
Payer: COMMERCIAL

## 2021-08-10 ENCOUNTER — OFFICE VISIT (OUTPATIENT)
Dept: MEDICAL GROUP | Facility: PHYSICIAN GROUP | Age: 31
End: 2021-08-10
Payer: COMMERCIAL

## 2021-08-10 VITALS
TEMPERATURE: 97.6 F | SYSTOLIC BLOOD PRESSURE: 122 MMHG | OXYGEN SATURATION: 96 % | BODY MASS INDEX: 45.43 KG/M2 | HEART RATE: 76 BPM | HEIGHT: 62 IN | RESPIRATION RATE: 16 BRPM | DIASTOLIC BLOOD PRESSURE: 72 MMHG | WEIGHT: 246.9 LBS

## 2021-08-10 DIAGNOSIS — N89.8 VAGINAL DISCHARGE: ICD-10-CM

## 2021-08-10 DIAGNOSIS — R87.810 PAPANICOLAOU SMEAR OF CERVIX WITH POSITIVE HIGH RISK HUMAN PAPILLOMA VIRUS (HPV) TEST: ICD-10-CM

## 2021-08-10 DIAGNOSIS — A60.00 GENITAL HERPES SIMPLEX, UNSPECIFIED SITE: ICD-10-CM

## 2021-08-10 DIAGNOSIS — F33.1 MODERATE EPISODE OF RECURRENT MAJOR DEPRESSIVE DISORDER (HCC): ICD-10-CM

## 2021-08-10 DIAGNOSIS — Z97.5 IUD (INTRAUTERINE DEVICE) IN PLACE: ICD-10-CM

## 2021-08-10 DIAGNOSIS — Z30.09 FAMILY PLANNING: ICD-10-CM

## 2021-08-10 DIAGNOSIS — J30.2 SEASONAL ALLERGIES: ICD-10-CM

## 2021-08-10 PROCEDURE — 87624 HPV HI-RISK TYP POOLED RSLT: CPT

## 2021-08-10 PROCEDURE — 99214 OFFICE O/P EST MOD 30 MIN: CPT | Performed by: NURSE PRACTITIONER

## 2021-08-10 PROCEDURE — 88175 CYTOPATH C/V AUTO FLUID REDO: CPT

## 2021-08-10 PROCEDURE — 87480 CANDIDA DNA DIR PROBE: CPT

## 2021-08-10 PROCEDURE — 87660 TRICHOMONAS VAGIN DIR PROBE: CPT

## 2021-08-10 PROCEDURE — 87510 GARDNER VAG DNA DIR PROBE: CPT

## 2021-08-10 RX ORDER — MONTELUKAST SODIUM 10 MG/1
10 TABLET ORAL DAILY
Qty: 90 TABLET | Refills: 3 | Status: SHIPPED | OUTPATIENT
Start: 2021-08-10 | End: 2022-09-06 | Stop reason: SDUPTHER

## 2021-08-10 RX ORDER — VALACYCLOVIR HYDROCHLORIDE 500 MG/1
TABLET, FILM COATED ORAL
Qty: 30 TABLET | Refills: 1 | Status: SHIPPED | OUTPATIENT
Start: 2021-08-10 | End: 2022-04-12

## 2021-08-10 NOTE — PROGRESS NOTES
CC: Repeat Pap, medication refills    HISTORY OF THE PRESENT ILLNESS: Patient is a 30 y.o. female. This pleasant patient is here today for evaluation and management of the following health problems.      Episode of recurrent major depressive disorder (HCC)  This is a chronic health problem that is well controlled with current medications and lifestyle measures.  Taking sertraline 100 mg daily.  Increased dose is working well.  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      Papanicolaou smear of cervix with positive high risk human papilloma virus (HPV) test  Patient presents today for Jose L Pap smear secondary to results from Pap 3 months ago showing high risk HPV.  Patient reports mild vaginal discharge.  Denies itching, dyspareunia.  Does have IUD.  Plans on starting a family soon and would like IUD removed.    Genital herpes  Last flare about 3 months ago.  Relieved with acyclovir.  Patient requesting refills today.      Allergies: Vicodin [hydrocodone-acetaminophen]    Current Outpatient Medications Ordered in Epic   Medication Sig Dispense Refill   • montelukast (SINGULAIR) 10 MG Tab Take 1 tablet by mouth every day. 90 tablet 3   • valACYclovir (VALTREX) 500 MG Tab Take 1 tab twice a day for 3 days if needed for herpes flare. 30 tablet 1   • sertraline (ZOLOFT) 100 MG Tab Take 1 Tab by mouth every day. 90 Tab 1   • propranolol (INDERAL) 20 MG Tab Take 1 Tab by mouth 2 times a day as needed. 30 Tab 5   • levonorgestrel (MIRENA, 52 MG,) 20 MCG/24HR IUD 1 Each by Intrauterine route Once.       No current Epic-ordered facility-administered medications on file.       Past Medical History:   Diagnosis Date   • Allergy    • Anxiety    • Depression    • GERD (gastroesophageal reflux disease)    • Head ache    • Urinary tract infection        Past Surgical History:   Procedure Laterality Date   • DENTAL  "EXTRACTION(S)         Social History     Tobacco Use   • Smoking status: Never Smoker   • Smokeless tobacco: Never Used   Vaping Use   • Vaping Use: Never used   Substance Use Topics   • Alcohol use: Yes   • Drug use: Never       Family History   Problem Relation Age of Onset   • Arthritis Mother    • Cancer Mother         uterine   • Heart Disease Mother    • Hypertension Mother    • Drug abuse Mother    • Alcohol abuse Father    • Scoliosis Sister    • Thyroid Maternal Grandmother    • Thyroid Sister         hashimoto       ROS:   As in HPI, otherwise negative for chest pain, dyspnea, abdominal pain, dysuria, blood in stool, fever          Exam: /72 (BP Location: Left arm, Patient Position: Sitting, BP Cuff Size: Adult)   Pulse 76   Temp 36.4 °C (97.6 °F) (Temporal)   Resp 16   Ht 1.575 m (5' 2\")   Wt 112 kg (246 lb 14.4 oz)   SpO2 96%  Body mass index is 45.16 kg/m².    General: Alert, pleasant, well nourished, well developed female in NAD  Neck: Supple without bruit.   Pulmonary: Clear to ausculation.  Normal effort. No rales, ronchi, or wheezing.  Cardiovascular: Normal rate and rhythm without murmur. Carotid and radial pulses are intact and equal bilaterally.  No lower extremity edema.  Abdomen: Soft, nontender, nondistended. Normal bowel sounds. Liver and spleen are not palpable  Neurologic: Grossly nonfocal  Skin: Warm and dry.    Musculoskeletal: Normal gait.   Psych: Normal mood and affect. Alert and oriented. Judgment and insight is normal.    Pelvic Exam -  Normal external genitalia with no lesions. Normal vaginal mucosa with normal rugation and scant discharge. Cervix with no visible lesions. IUD string not visible. No cervical motion tenderness. Uterus is normal sized with no masses. No adnexal tenderness or enlargement appreciated. Sure Path Pap obtained, and specimen(s) sent to lab    A chaperone was offered to the patient during today's exam. Patient declined chaperone.      Please note " that this dictation was created using voice recognition software. I have made every reasonable attempt to correct obvious errors, but I expect that there are errors of grammar and possibly content that I did not discover before finalizing the note.      Assessment/Plan  1. Papanicolaou smear of cervix with positive high risk human papilloma virus (HPV) test  Repeat test.  If positive, will follow up with gynecology.  Patient has been referred.  Will notify patient of results.  Previous Pap showed sallie that may be indicative of bacterial vaginitis.  Vaginal pathogen swab done today.  Will notify patient of results.  - THINPREP PAP WITH HPV; Future    2. Moderate episode of recurrent major depressive disorder (HCC)  Well-controlled.  Continue with sertraline.    3. Seasonal allergies  Patient requesting refill today.  - montelukast (SINGULAIR) 10 MG Tab; Take 1 tablet by mouth every day.  Dispense: 90 tablet; Refill: 3    4. Genital herpes simplex, unspecified site  Well-controlled.  Patient requesting refill.  - valACYclovir (VALTREX) 500 MG Tab; Take 1 tab twice a day for 3 days if needed for herpes flare.  Dispense: 30 tablet; Refill: 1    5. IUD (intrauterine device) in place  Patient would like IUD removed as she and her  would like to start a family.  Will refer to gynecology.  Of note, IUD string not visible during exam today.  - REFERRAL TO OB/GYN    6. Family planning    - REFERRAL TO OB/GYN    7. Vaginal discharge  As in #1  - VAGINAL PATHOGENS DNA PANEL; Future

## 2021-08-10 NOTE — ASSESSMENT & PLAN NOTE
This is a chronic health problem that is well controlled with current medications and lifestyle measures.  Taking sertraline 100 mg daily.  Increased dose is working well.  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

## 2021-08-11 PROBLEM — R87.810 PAPANICOLAOU SMEAR OF CERVIX WITH POSITIVE HIGH RISK HUMAN PAPILLOMA VIRUS (HPV) TEST: Status: ACTIVE | Noted: 2021-08-11

## 2021-08-11 LAB
CANDIDA DNA VAG QL PROBE+SIG AMP: NEGATIVE
G VAGINALIS DNA VAG QL PROBE+SIG AMP: POSITIVE
T VAGINALIS DNA VAG QL PROBE+SIG AMP: NEGATIVE

## 2021-08-11 NOTE — ASSESSMENT & PLAN NOTE
Patient presents today for Jose L Pap smear secondary to results from Pap 3 months ago showing high risk HPV.  Patient reports mild vaginal discharge.  Denies itching, dyspareunia.  Does have IUD.  Plans on starting a family soon and would like IUD removed.

## 2021-08-12 ENCOUNTER — TELEPHONE (OUTPATIENT)
Dept: MEDICAL GROUP | Facility: PHYSICIAN GROUP | Age: 31
End: 2021-08-12

## 2021-08-12 DIAGNOSIS — B96.89 BACTERIAL VAGINITIS: ICD-10-CM

## 2021-08-12 DIAGNOSIS — N76.0 BACTERIAL VAGINITIS: ICD-10-CM

## 2021-08-12 LAB
CYTOLOGY REG CYTOL: NORMAL
HPV HR 12 DNA CVX QL NAA+PROBE: NEGATIVE
HPV16 DNA SPEC QL NAA+PROBE: NEGATIVE
HPV18 DNA SPEC QL NAA+PROBE: NEGATIVE
SPECIMEN SOURCE: NORMAL

## 2021-08-12 RX ORDER — METRONIDAZOLE 500 MG/1
500 TABLET ORAL 2 TIMES DAILY
Qty: 14 TABLET | Refills: 0 | Status: SHIPPED | OUTPATIENT
Start: 2021-08-12 | End: 2021-12-23

## 2021-12-02 DIAGNOSIS — F33.1 MODERATE EPISODE OF RECURRENT MAJOR DEPRESSIVE DISORDER (HCC): ICD-10-CM

## 2021-12-02 DIAGNOSIS — F41.9 ANXIETY: ICD-10-CM

## 2021-12-03 RX ORDER — SERTRALINE HYDROCHLORIDE 100 MG/1
TABLET, FILM COATED ORAL
Qty: 90 TABLET | Refills: 3 | Status: SHIPPED | OUTPATIENT
Start: 2021-12-03

## 2021-12-23 ENCOUNTER — TELEMEDICINE (OUTPATIENT)
Dept: MEDICAL GROUP | Facility: PHYSICIAN GROUP | Age: 31
End: 2021-12-23
Payer: COMMERCIAL

## 2021-12-23 VITALS — WEIGHT: 240 LBS | HEIGHT: 62 IN | BODY MASS INDEX: 44.16 KG/M2

## 2021-12-23 DIAGNOSIS — G56.03 BILATERAL CARPAL TUNNEL SYNDROME: ICD-10-CM

## 2021-12-23 PROCEDURE — 99214 OFFICE O/P EST MOD 30 MIN: CPT | Mod: 95,CR | Performed by: NURSE PRACTITIONER

## 2021-12-23 RX ORDER — BUSPIRONE HYDROCHLORIDE 5 MG/1
TABLET ORAL 2 TIMES DAILY
COMMUNITY
Start: 2021-12-16 | End: 2023-04-07

## 2021-12-23 RX ORDER — MELOXICAM 7.5 MG/1
7.5 TABLET ORAL DAILY
Qty: 30 TABLET | Refills: 2 | Status: SHIPPED | OUTPATIENT
Start: 2021-12-23 | End: 2023-04-07

## 2021-12-23 ASSESSMENT — PATIENT HEALTH QUESTIONNAIRE - PHQ9
SUM OF ALL RESPONSES TO PHQ9 QUESTIONS 1 AND 2: 2
SUM OF ALL RESPONSES TO PHQ QUESTIONS 1-9: 4
2. FEELING DOWN, DEPRESSED, IRRITABLE, OR HOPELESS: NOT AT ALL
1. LITTLE INTEREST OR PLEASURE IN DOING THINGS: MORE THAN HALF THE DAYS
4. FEELING TIRED OR HAVING LITTLE ENERGY: SEVERAL DAYS
5. POOR APPETITE OR OVEREATING: NOT AT ALL
8. MOVING OR SPEAKING SO SLOWLY THAT OTHER PEOPLE COULD HAVE NOTICED. OR THE OPPOSITE, BEING SO FIGETY OR RESTLESS THAT YOU HAVE BEEN MOVING AROUND A LOT MORE THAN USUAL: NOT AT ALL
9. THOUGHTS THAT YOU WOULD BE BETTER OFF DEAD, OR OF HURTING YOURSELF: NOT AT ALL
7. TROUBLE CONCENTRATING ON THINGS, SUCH AS READING THE NEWSPAPER OR WATCHING TELEVISION: SEVERAL DAYS
6. FEELING BAD ABOUT YOURSELF - OR THAT YOU ARE A FAILURE OR HAVE LET YOURSELF OR YOUR FAMILY DOWN: NOT AL ALL
3. TROUBLE FALLING OR STAYING ASLEEP OR SLEEPING TOO MUCH: NOT AT ALL

## 2021-12-23 NOTE — ASSESSMENT & PLAN NOTE
"As a means of avoiding spread of COVID-19, this visit is being conducted by video.  Patient reports worsening hand numbness and tingling, right worse than left.  Has been struggling with carpal tunnel for over a year.  Was temporarily relieved with carpal tunnel braces at night.  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  She wakes in the middle of the night frequently even though she is wearing her braces.  She will get numbness and tingling in her middle and ring fingers during the day.  It will progress to pain if she does not stop the activity that she is doing.  Ibuprofen has helped a little.  Denies erythema or edema.  "

## 2021-12-23 NOTE — PROGRESS NOTES
"  Virtual Visit: Established Patient   This visit was conducted via Zoom using secure and encrypted videoconferencing technology.   The patient was in a private location in the state of [place].    The patient's identity was confirmed and verbal consent was obtained for this virtual visit.    Subjective:   CC:   Chief Complaint   Patient presents with   • Wrist Pain     numbness is progressing, waking up with hand numb and burning with pain        Christina KENN is a 31 y.o. female presenting for evaluation and management of:    Bilateral carpal tunnel syndrome  As a means of avoiding spread of COVID-19, this visit is being conducted by video.  Patient reports worsening hand numbness and tingling, right worse than left.  Has been struggling with carpal tunnel for over a year.  Was temporarily relieved with carpal tunnel braces at night.  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  She wakes in the middle of the night frequently even though she is wearing her braces.  She will get numbness and tingling in her middle and ring fingers during the day.  It will progress to pain if she does not stop the activity that she is doing.  Ibuprofen has helped a little.  Denies erythema or edema.      ROS   As in HPI, otherwise negative for chest pain,  fever      Current medicines (including changes today)  Current Outpatient Medications   Medication Sig Dispense Refill   • busPIRone (BUSPAR) 5 MG tablet 2 times a day.     • meloxicam (MOBIC) 7.5 MG Tab Take 1 Tablet by mouth every day. 30 Tablet 2   • sertraline (ZOLOFT) 100 MG Tab Take 1 tablet by mouth once daily 90 Tablet 3   • montelukast (SINGULAIR) 10 MG Tab Take 1 tablet by mouth every day. 90 tablet 3   • valACYclovir (VALTREX) 500 MG Tab Take 1 tab twice a day for 3 days if needed for herpes flare. 30 tablet 1   • propranolol (INDERAL) 20 MG Tab Take 1 Tab by mouth 2 times a day as " "needed. 30 Tab 5   • metroNIDAZOLE (FLAGYL) 500 MG Tab Take 1 Tablet by mouth 2 times a day. (Patient not taking: Reported on 12/23/2021) 14 Tablet 0   • levonorgestrel (MIRENA, 52 MG,) 20 MCG/24HR IUD 1 Each by Intrauterine route Once. (Patient not taking: Reported on 12/23/2021)       No current facility-administered medications for this visit.       Patient Active Problem List    Diagnosis Date Noted   • Papanicolaou smear of cervix with positive high risk human papilloma virus (HPV) test 08/11/2021   • Morbid obesity with BMI of 40.0-44.9, adult (HCC) 01/06/2021   • Episode of recurrent major depressive disorder (HCC) 01/06/2021   • Bilateral carpal tunnel syndrome 01/06/2021   • Seasonal allergies 09/18/2020   • Genital herpes 09/18/2020   • Anxiety 09/18/2020   • Enlarged thyroid 09/18/2020        Objective:   Ht 1.575 m (5' 2\") Comment: per pt VV  Wt 109 kg (240 lb) Comment: per pt VV  BMI 43.90 kg/m²     Physical Exam:  Constitutional: Alert, no distress, well-groomed.  Skin: No rashes in visible areas.  Eye: Round. Conjunctiva clear, lids normal. No icterus.   ENMT: Lips pink without lesions, good dentition, moist mucous membranes. Phonation normal.  Neck: No masses, no thyromegaly. Moves freely without pain.  Respiratory: Unlabored respiratory effort, no cough or audible wheeze  Psych: Alert and oriented x3, normal affect and mood.     Assessment and Plan:   The following treatment plan was discussed:   1. Bilateral carpal tunnel syndrome  Patient having worsening carpal tunnel.  Will refer to hand surgeon for further evaluation and treatment.  We will also refer to physical therapy.  Patient will start meloxicam once a day.  Instructions side effects reviewed with patient.  She understands not to take other NSAIDs while taking meloxicam.  - meloxicam (MOBIC) 7.5 MG Tab; Take 1 Tablet by mouth every day.  Dispense: 30 Tablet; Refill: 2  - Referral to Hand Surgery  - Referral to Physical " Therapy    Follow-up: No follow-ups on file.

## 2022-04-11 DIAGNOSIS — A60.00 GENITAL HERPES SIMPLEX, UNSPECIFIED SITE: ICD-10-CM

## 2022-04-12 ENCOUNTER — HOSPITAL ENCOUNTER (OUTPATIENT)
Dept: LAB | Facility: MEDICAL CENTER | Age: 32
End: 2022-04-12
Attending: OBSTETRICS & GYNECOLOGY
Payer: COMMERCIAL

## 2022-04-12 LAB
ALBUMIN SERPL BCP-MCNC: 4.5 G/DL (ref 3.2–4.9)
ALBUMIN/GLOB SERPL: 2 G/DL
ALP SERPL-CCNC: 79 U/L (ref 30–99)
ALT SERPL-CCNC: 17 U/L (ref 2–50)
ANION GAP SERPL CALC-SCNC: 10 MMOL/L (ref 7–16)
AST SERPL-CCNC: 16 U/L (ref 12–45)
BILIRUB SERPL-MCNC: 0.4 MG/DL (ref 0.1–1.5)
BUN SERPL-MCNC: 13 MG/DL (ref 8–22)
CALCIUM SERPL-MCNC: 9.4 MG/DL (ref 8.5–10.5)
CHLORIDE SERPL-SCNC: 103 MMOL/L (ref 96–112)
CHOLEST SERPL-MCNC: 191 MG/DL (ref 100–199)
CO2 SERPL-SCNC: 26 MMOL/L (ref 20–33)
CREAT SERPL-MCNC: 0.7 MG/DL (ref 0.5–1.4)
ERYTHROCYTE [DISTWIDTH] IN BLOOD BY AUTOMATED COUNT: 43.3 FL (ref 35.9–50)
FASTING STATUS PATIENT QL REPORTED: NORMAL
FASTING STATUS PATIENT QL REPORTED: NORMAL
GFR SERPLBLD CREATININE-BSD FMLA CKD-EPI: 117 ML/MIN/1.73 M 2
GLOBULIN SER CALC-MCNC: 2.3 G/DL (ref 1.9–3.5)
GLUCOSE SERPL-MCNC: 98 MG/DL (ref 65–99)
HCT VFR BLD AUTO: 44.4 % (ref 37–47)
HDLC SERPL-MCNC: 50 MG/DL
HGB BLD-MCNC: 14.5 G/DL (ref 12–16)
LDLC SERPL CALC-MCNC: 115 MG/DL
MCH RBC QN AUTO: 29.1 PG (ref 27–33)
MCHC RBC AUTO-ENTMCNC: 32.7 G/DL (ref 33.6–35)
MCV RBC AUTO: 89 FL (ref 81.4–97.8)
PLATELET # BLD AUTO: 244 K/UL (ref 164–446)
PMV BLD AUTO: 11.9 FL (ref 9–12.9)
POTASSIUM SERPL-SCNC: 4.2 MMOL/L (ref 3.6–5.5)
PROT SERPL-MCNC: 6.8 G/DL (ref 6–8.2)
RBC # BLD AUTO: 4.99 M/UL (ref 4.2–5.4)
SODIUM SERPL-SCNC: 139 MMOL/L (ref 135–145)
TESTOST SERPL-MCNC: 23 NG/DL (ref 9–75)
TRIGL SERPL-MCNC: 130 MG/DL (ref 0–149)
TSH SERPL DL<=0.005 MIU/L-ACNC: 3.11 UIU/ML (ref 0.38–5.33)
WBC # BLD AUTO: 6.2 K/UL (ref 4.8–10.8)

## 2022-04-12 PROCEDURE — 84403 ASSAY OF TOTAL TESTOSTERONE: CPT

## 2022-04-12 PROCEDURE — 80053 COMPREHEN METABOLIC PANEL: CPT

## 2022-04-12 PROCEDURE — 84144 ASSAY OF PROGESTERONE: CPT

## 2022-04-12 PROCEDURE — 36415 COLL VENOUS BLD VENIPUNCTURE: CPT

## 2022-04-12 PROCEDURE — 85027 COMPLETE CBC AUTOMATED: CPT

## 2022-04-12 PROCEDURE — 84443 ASSAY THYROID STIM HORMONE: CPT

## 2022-04-12 PROCEDURE — 80061 LIPID PANEL: CPT

## 2022-04-12 RX ORDER — VALACYCLOVIR HYDROCHLORIDE 500 MG/1
TABLET, FILM COATED ORAL
Qty: 30 TABLET | Refills: 3 | Status: SHIPPED | OUTPATIENT
Start: 2022-04-12 | End: 2023-04-07 | Stop reason: SDUPTHER

## 2022-04-18 LAB — PROGEST SERPL-MCNC: NORMAL NG/ML

## 2022-04-19 ENCOUNTER — HOSPITAL ENCOUNTER (OUTPATIENT)
Dept: LAB | Facility: MEDICAL CENTER | Age: 32
End: 2022-04-19
Attending: OBSTETRICS & GYNECOLOGY
Payer: COMMERCIAL

## 2022-04-19 PROCEDURE — 36415 COLL VENOUS BLD VENIPUNCTURE: CPT

## 2022-04-19 PROCEDURE — 83498 ASY HYDROXYPROGESTERONE 17-D: CPT

## 2022-04-24 LAB — 17OHP SERPL-MCNC: 54.01 NG/DL

## 2022-08-18 PROBLEM — G56.01 RIGHT CARPAL TUNNEL SYNDROME: Status: ACTIVE | Noted: 2022-08-18

## 2022-09-06 DIAGNOSIS — J30.2 SEASONAL ALLERGIES: ICD-10-CM

## 2022-09-06 RX ORDER — MONTELUKAST SODIUM 10 MG/1
10 TABLET ORAL DAILY
Qty: 90 TABLET | Refills: 3 | Status: SHIPPED | OUTPATIENT
Start: 2022-09-06

## 2022-09-06 NOTE — TELEPHONE ENCOUNTER
Received request via: Patient    Was the patient seen in the last year in this department? Yes    Does the patient have an active prescription (recently filled or refills available) for medication(s) requested? No    Last OV 02/01/22  Last labs 04/12/22  Next OV 11/17/22

## 2022-11-04 PROBLEM — G56.01 CARPAL TUNNEL SYNDROME, RIGHT: Status: ACTIVE | Noted: 2022-11-04

## 2023-03-23 ENCOUNTER — APPOINTMENT (OUTPATIENT)
Dept: MEDICAL GROUP | Facility: PHYSICIAN GROUP | Age: 33
End: 2023-03-23
Payer: COMMERCIAL

## 2023-04-07 ENCOUNTER — OFFICE VISIT (OUTPATIENT)
Dept: MEDICAL GROUP | Facility: PHYSICIAN GROUP | Age: 33
End: 2023-04-07
Payer: COMMERCIAL

## 2023-04-07 VITALS
SYSTOLIC BLOOD PRESSURE: 106 MMHG | HEIGHT: 62 IN | TEMPERATURE: 97.7 F | BODY MASS INDEX: 43.92 KG/M2 | OXYGEN SATURATION: 99 % | DIASTOLIC BLOOD PRESSURE: 80 MMHG | HEART RATE: 83 BPM | WEIGHT: 238.7 LBS | RESPIRATION RATE: 18 BRPM

## 2023-04-07 DIAGNOSIS — G56.01 RIGHT CARPAL TUNNEL SYNDROME: ICD-10-CM

## 2023-04-07 DIAGNOSIS — Z13.29 SCREENING FOR THYROID DISORDER: ICD-10-CM

## 2023-04-07 DIAGNOSIS — Z13.6 SCREENING FOR CARDIOVASCULAR CONDITION: ICD-10-CM

## 2023-04-07 DIAGNOSIS — E66.01 MORBID OBESITY WITH BMI OF 40.0-44.9, ADULT (HCC): ICD-10-CM

## 2023-04-07 DIAGNOSIS — A60.00 GENITAL HERPES SIMPLEX, UNSPECIFIED SITE: ICD-10-CM

## 2023-04-07 DIAGNOSIS — Z13.21 ENCOUNTER FOR VITAMIN DEFICIENCY SCREENING: ICD-10-CM

## 2023-04-07 DIAGNOSIS — R68.84 JAW PAIN: ICD-10-CM

## 2023-04-07 DIAGNOSIS — M65.311 TRIGGER FINGER OF RIGHT THUMB: ICD-10-CM

## 2023-04-07 PROCEDURE — 99213 OFFICE O/P EST LOW 20 MIN: CPT | Performed by: NURSE PRACTITIONER

## 2023-04-07 RX ORDER — VALACYCLOVIR HYDROCHLORIDE 500 MG/1
TABLET, FILM COATED ORAL
Qty: 30 TABLET | Refills: 3 | Status: SHIPPED | OUTPATIENT
Start: 2023-04-07

## 2023-04-07 RX ORDER — BUSPIRONE HYDROCHLORIDE 10 MG/1
10 TABLET ORAL 2 TIMES DAILY
COMMUNITY
Start: 2023-03-14

## 2023-04-07 ASSESSMENT — FIBROSIS 4 INDEX: FIB4 SCORE: 0.52

## 2023-04-07 ASSESSMENT — PATIENT HEALTH QUESTIONNAIRE - PHQ9: CLINICAL INTERPRETATION OF PHQ2 SCORE: 0

## 2023-04-07 NOTE — PROGRESS NOTES
CC: Help for weight loss, medication refill, jaw pain, thumb getting stuck    HISTORY OF THE PRESENT ILLNESS: Patient is a 32 y.o. female. This pleasant patient is here today for evaluation and management of the following health problems.        Morbid obesity with BMI of 40.0-44.9, adult (HCC)  Patient continues to struggle with obesity.  Difficulty losing weight.  Has tried multiple diets.  Currently on Noom.  Exercising by walking her dogs 2-3 times a week.  On her feet all day at work.  Wonders if there is a medication that can help with weight loss.      Right carpal tunnel syndrome  Patient had right carpal tunnel release.  Doing well.    Genital herpes  Has noticed that flares are becoming more frequent in the last year.  Relieved with valacyclovir.  Requesting refill today.    Jaw pain  Patient reports recent onset of left side TMJ pinching sensation and shooting pain when eating and when lying on left side at night.  Sometimes will radiate into left ear.  Denies injury, edema, erythema, fever.  Has upcoming appointment with dentist next week.    Trigger finger of right thumb  Patient reports recent onset of right thumb locking in flexed position.  Some pain radiating down base of thumb.  Denies injury, edema, erythema, weakness, numbness and tingling.    Allergies: Codeine and Vicodin [hydrocodone-acetaminophen]    Current Outpatient Medications Ordered in Epic   Medication Sig Dispense Refill    busPIRone (BUSPAR) 10 MG Tab tablet Take 10 mg by mouth 2 times a day.      valACYclovir (VALTREX) 500 MG Tab TAKE 1 TABLET BY MOUTH TWICE DAILY FOR  3  DAYS  IF  NEEDED  FOR  HERPES  FLARE 30 Tablet 3    montelukast (SINGULAIR) 10 MG Tab Take 1 Tablet by mouth every day. 90 Tablet 3    sertraline (ZOLOFT) 100 MG Tab Take 1 tablet by mouth once daily 90 Tablet 3     No current Epic-ordered facility-administered medications on file.       Past Medical History:   Diagnosis Date    Allergy     Anxiety     Depression   "   GERD (gastroesophageal reflux disease)     Head ache     Urinary tract infection        Past Surgical History:   Procedure Laterality Date    [place] NEUROPLASTY & OR TRANSPOS MEDIAN NRV CARPAL ALHAJI Right 11/4/2022    Procedure: RIGHT HAND ENDOSCOPIC CARPAL TUNNEL RELEASE;  Surgeon: Nikki Wooten M.D.;  Location: [facility];  Service: Orthopedics    DENTAL EXTRACTION(S)         Social History     Tobacco Use    Smoking status: Never    Smokeless tobacco: Never   Vaping Use    Vaping Use: Never used   Substance Use Topics    Alcohol use: Not Currently     Comment: seldon    Drug use: Never       Family History   Problem Relation Age of Onset    Arthritis Mother     Cancer Mother         uterine    Heart Disease Mother     Hypertension Mother     Drug abuse Mother     Alcohol abuse Father     Scoliosis Sister     Thyroid Maternal Grandmother     Thyroid Sister         hashimoto       ROS: As in HPI, otherwise negative for chest pain, dyspnea, abdominal pain, dysuria, blood in stool, fever         Exam: /80 (BP Location: Left arm)   Pulse 83   Temp 36.5 °C (97.7 °F) (Temporal)   Resp 18   Ht 1.575 m (5' 2\")   Wt 108 kg (238 lb 11.2 oz)   SpO2 99%  Body mass index is 43.66 kg/m².    General: Alert, pleasant, well nourished, well developed female in NAD  HEENT: Normocephalic. Eyes conjunctiva clear lids without ptosis, pupils equal and reactive to light, ears normal shape and contour, canals are clear bilaterally, tympanic membranes are pearly gray with good light reflex, nasal mucosa without erythema and drainage, oropharynx is without erythema, edema or exudates.   Bilateral TMJs nontender to palpation, no erythema, no clicking or popping with jaw movement.  Neck: Supple without bruit. Thyroid is not enlarged.  Pulmonary: Clear to ausculation.  Normal effort. No rales, ronchi, or wheezing.  Cardiovascular: Normal rate and rhythm without murmur. Carotid and radial pulses are " intact and equal bilaterally.  No lower extremity edema.  Abdomen: Soft, nontender, nondistended. Normal bowel sounds. Liver and spleen are not palpable  Neurologic: Grossly nonfocal  Lymph: No cervical or supraclavicular lymph nodes are palpable  Skin: Warm and dry.    Musculoskeletal: Normal gait.   Psych: Normal mood and affect. Alert and oriented. Judgment and insight is normal.    Please note that this dictation was created using voice recognition software. I have made every reasonable attempt to correct obvious errors, but I expect that there are errors of grammar and possibly content that I did not discover before finalizing the note.      Assessment/Plan  1. Morbid obesity with BMI of 40.0-44.9, adult ([facility])  Reviewed options with patient for medication management including GLP-1's, phentermine.  Patient would like to pursue phentermine.  Has not taken it in the past.  Would like her to get lab work done first and return to clinic for EKG and prescription.  - Patient identified as having weight management issue.  Appropriate orders and counseling given.    2. Genital herpes simplex, unspecified site  Well-controlled.  Consider daily medication if flares increase.  Refill sent to pharmacy.  - valACYclovir (VALTREX) 500 MG Tab; TAKE 1 TABLET BY MOUTH TWICE DAILY FOR  3  DAYS  IF  NEEDED  FOR  HERPES  FLARE  Dispense: 30 Tablet; Refill: 3    3. Screening for cardiovascular condition    - Comp Metabolic Panel; Future  - ESTIMATED GFR; Future  - Lipid Profile; Future  - CBC WITHOUT DIFFERENTIAL; Future    4. Screening for thyroid disorder    - TSH WITH REFLEX TO FT4; Future    5. Encounter for vitamin deficiency screening    - VITAMIN D,25 HYDROXY (DEFICIENCY); Future    6. Right carpal tunnel syndrome  Resolved with surgery.  Will be having left carpal tunnel surgery soon.    7. Jaw pain  No abnormalities on exam today.  Advised on ice to the area 2-3 times a day, NSAIDs with food, nightguard.  Advised to  follow-up with dentist next week.  Consider referral to ENT.    8. Trigger finger of right thumb  Reviewed with patient this is right trigger thumb.  Advised on ice, NSAIDs.  Handout given to patient today.  She is established with hand surgeon and will address at upcoming appointment she has for her left carpal tunnel.    Patient will return to clinic in 4 weeks for lab review, EKG, consideration of phentermine prescription.

## 2023-04-07 NOTE — ASSESSMENT & PLAN NOTE
Patient continues to struggle with obesity.  Difficulty losing weight.  Has tried multiple diets.  Currently on Noom.  Exercising by walking her dogs 2-3 times a week.  On her feet all day at work.  Wonders if there is a medication that can help with weight loss.

## 2023-04-08 NOTE — ASSESSMENT & PLAN NOTE
Patient reports recent onset of right thumb locking in flexed position.  Some pain radiating down base of thumb.  Denies injury, edema, erythema, weakness, numbness and tingling.

## 2023-04-08 NOTE — ASSESSMENT & PLAN NOTE
Patient reports recent onset of left side TMJ pinching sensation and shooting pain when eating and when lying on left side at night.  Sometimes will radiate into left ear.  Denies injury, edema, erythema, fever.  Has upcoming appointment with dentist next week.

## 2023-04-11 ENCOUNTER — HOSPITAL ENCOUNTER (OUTPATIENT)
Dept: LAB | Facility: MEDICAL CENTER | Age: 33
End: 2023-04-11
Attending: NURSE PRACTITIONER
Payer: COMMERCIAL

## 2023-04-11 DIAGNOSIS — Z13.21 ENCOUNTER FOR VITAMIN DEFICIENCY SCREENING: ICD-10-CM

## 2023-04-11 DIAGNOSIS — Z13.6 SCREENING FOR CARDIOVASCULAR CONDITION: ICD-10-CM

## 2023-04-11 DIAGNOSIS — Z13.29 SCREENING FOR THYROID DISORDER: ICD-10-CM

## 2023-04-11 LAB
ALBUMIN SERPL BCP-MCNC: 4 G/DL (ref 3.2–4.9)
ALBUMIN/GLOB SERPL: 1.3 G/DL
ALP SERPL-CCNC: 79 U/L (ref 30–99)
ALT SERPL-CCNC: 24 U/L (ref 2–50)
ANION GAP SERPL CALC-SCNC: 12 MMOL/L (ref 7–16)
AST SERPL-CCNC: 20 U/L (ref 12–45)
BILIRUB SERPL-MCNC: 0.5 MG/DL (ref 0.1–1.5)
BUN SERPL-MCNC: 11 MG/DL (ref 8–22)
CALCIUM ALBUM COR SERPL-MCNC: 8.8 MG/DL (ref 8.5–10.5)
CALCIUM SERPL-MCNC: 8.8 MG/DL (ref 8.5–10.5)
CHLORIDE SERPL-SCNC: 106 MMOL/L (ref 96–112)
CHOLEST SERPL-MCNC: 205 MG/DL (ref 100–199)
CO2 SERPL-SCNC: 23 MMOL/L (ref 20–33)
CREAT SERPL-MCNC: 0.59 MG/DL (ref 0.5–1.4)
ERYTHROCYTE [DISTWIDTH] IN BLOOD BY AUTOMATED COUNT: 40.1 FL (ref 35.9–50)
FASTING STATUS PATIENT QL REPORTED: NORMAL
GFR SERPLBLD CREATININE-BSD FMLA CKD-EPI: 121 ML/MIN/1.73 M 2
GLOBULIN SER CALC-MCNC: 3 G/DL (ref 1.9–3.5)
GLUCOSE SERPL-MCNC: 84 MG/DL (ref 65–99)
HCT VFR BLD AUTO: 43.7 % (ref 37–47)
HDLC SERPL-MCNC: 50 MG/DL
HGB BLD-MCNC: 14.8 G/DL (ref 12–16)
LDLC SERPL CALC-MCNC: 136 MG/DL
MCH RBC QN AUTO: 29.6 PG (ref 27–33)
MCHC RBC AUTO-ENTMCNC: 33.9 G/DL (ref 33.6–35)
MCV RBC AUTO: 87.4 FL (ref 81.4–97.8)
PLATELET # BLD AUTO: 234 K/UL (ref 164–446)
PMV BLD AUTO: 11.6 FL (ref 9–12.9)
POTASSIUM SERPL-SCNC: 4 MMOL/L (ref 3.6–5.5)
PROT SERPL-MCNC: 7 G/DL (ref 6–8.2)
RBC # BLD AUTO: 5 M/UL (ref 4.2–5.4)
SODIUM SERPL-SCNC: 141 MMOL/L (ref 135–145)
TRIGL SERPL-MCNC: 94 MG/DL (ref 0–149)
WBC # BLD AUTO: 6.9 K/UL (ref 4.8–10.8)

## 2023-04-11 PROCEDURE — 85027 COMPLETE CBC AUTOMATED: CPT

## 2023-04-11 PROCEDURE — 84443 ASSAY THYROID STIM HORMONE: CPT

## 2023-04-11 PROCEDURE — 80053 COMPREHEN METABOLIC PANEL: CPT

## 2023-04-11 PROCEDURE — 82306 VITAMIN D 25 HYDROXY: CPT

## 2023-04-11 PROCEDURE — 80061 LIPID PANEL: CPT

## 2023-04-11 PROCEDURE — 36415 COLL VENOUS BLD VENIPUNCTURE: CPT

## 2023-04-12 LAB
25(OH)D3 SERPL-MCNC: 26 NG/ML (ref 30–100)
TSH SERPL DL<=0.005 MIU/L-ACNC: 2.72 UIU/ML (ref 0.38–5.33)

## 2023-04-20 ENCOUNTER — OFFICE VISIT (OUTPATIENT)
Dept: MEDICAL GROUP | Facility: PHYSICIAN GROUP | Age: 33
End: 2023-04-20
Payer: COMMERCIAL

## 2023-04-20 VITALS
SYSTOLIC BLOOD PRESSURE: 110 MMHG | HEART RATE: 92 BPM | HEIGHT: 62 IN | WEIGHT: 241.7 LBS | OXYGEN SATURATION: 95 % | DIASTOLIC BLOOD PRESSURE: 80 MMHG | BODY MASS INDEX: 44.48 KG/M2 | TEMPERATURE: 97.8 F | RESPIRATION RATE: 18 BRPM

## 2023-04-20 DIAGNOSIS — Z79.899 HIGH RISK MEDICATION USE: ICD-10-CM

## 2023-04-20 DIAGNOSIS — N97.9 INFERTILITY, FEMALE: ICD-10-CM

## 2023-04-20 DIAGNOSIS — E78.5 DYSLIPIDEMIA: ICD-10-CM

## 2023-04-20 DIAGNOSIS — E55.9 VITAMIN D INSUFFICIENCY: ICD-10-CM

## 2023-04-20 DIAGNOSIS — Z51.81 ENCOUNTER FOR MONITORING STIMULANT THERAPY: ICD-10-CM

## 2023-04-20 DIAGNOSIS — Z79.899 ENCOUNTER FOR MONITORING STIMULANT THERAPY: ICD-10-CM

## 2023-04-20 DIAGNOSIS — E66.01 MORBID OBESITY WITH BMI OF 40.0-44.9, ADULT (HCC): ICD-10-CM

## 2023-04-20 LAB
POCT INT CON NEG: NEGATIVE
POCT INT CON POS: POSITIVE
POCT URINE PREGNANCY TEST: NEGATIVE

## 2023-04-20 PROCEDURE — 99213 OFFICE O/P EST LOW 20 MIN: CPT | Performed by: NURSE PRACTITIONER

## 2023-04-20 PROCEDURE — 81025 URINE PREGNANCY TEST: CPT | Performed by: NURSE PRACTITIONER

## 2023-04-20 PROCEDURE — 93000 ELECTROCARDIOGRAM COMPLETE: CPT | Performed by: NURSE PRACTITIONER

## 2023-04-20 RX ORDER — PHENTERMINE HYDROCHLORIDE 15 MG/1
15 CAPSULE ORAL EVERY MORNING
Qty: 30 CAPSULE | Refills: 1 | Status: SHIPPED | OUTPATIENT
Start: 2023-04-20 | End: 2023-06-08 | Stop reason: SDUPTHER

## 2023-04-20 ASSESSMENT — FIBROSIS 4 INDEX: FIB4 SCORE: 0.58

## 2023-04-20 NOTE — PROGRESS NOTES
CC: Lab review, medication for weight loss    HISTORY OF THE PRESENT ILLNESS: Patient is a 32 y.o. female. This pleasant patient is here today for evaluation and management of the following health problems.        Morbid obesity with BMI of 40.0-44.9, adult ([facility])  HPI 4/7/23: Patient continues to struggle with obesity.  Difficulty losing weight.  Has tried multiple diets.  Currently on Noom.  Exercising by walking her dogs 2-3 times a week.  On her feet all day at work.  Wonders if there is a medication that can help with weight loss.    Today's HPI: No changes since last appointment.  Continues to do Noom diet, exercise by walking her dogs.  Interested in losing weight with help of phentermine medication.  Hoping weight loss will help with conception.  Please see additional notes.  Took phentermine over 10 years ago, denies adverse effects.      Infertility, female  Has had trouble conceiving for the last year.  Reportedly gynecologist told her that losing weight would help.  LMP 2 weeks ago.  Patient here for weight loss management.    Vitamin D insufficiency  New finding.  Vitamin D low at 26.  Not taking supplement.    Dyslipidemia  Component      Latest Ref Rng 4/11/2023   Cholesterol,Tot      100 - 199 mg/dL 205 (H)    Triglycerides      0 - 149 mg/dL 94    HDL      >=40 mg/dL 50    LDL      <100 mg/dL 136 (H)       (H) High      Allergies: Codeine and Vicodin [hydrocodone-acetaminophen]    Current Outpatient Medications Ordered in Epic   Medication Sig Dispense Refill    phentermine 15 MG capsule Take 1 Capsule by mouth every morning for 60 days. 30 Capsule 1    busPIRone (BUSPAR) 10 MG Tab tablet Take 10 mg by mouth 2 times a day.      valACYclovir (VALTREX) 500 MG Tab TAKE 1 TABLET BY MOUTH TWICE DAILY FOR  3  DAYS  IF  NEEDED  FOR  HERPES  FLARE 30 Tablet 3    montelukast (SINGULAIR) 10 MG Tab Take 1 Tablet by mouth every day. 90 Tablet 3    sertraline (ZOLOFT) 100 MG Tab Take 1 tablet by mouth once daily  "90 Tablet 3     No current Epic-ordered facility-administered medications on file.       Past Medical History:   Diagnosis Date    Allergy     Anxiety     Depression     GERD (gastroesophageal reflux disease)     Head ache     Urinary tract infection        Past Surgical History:   Procedure Laterality Date    [place] NEUROPLASTY & OR TRANSPOS MEDIAN NRV CARPAL ALHAJI Right 11/4/2022    Procedure: RIGHT HAND ENDOSCOPIC CARPAL TUNNEL RELEASE;  Surgeon: Nikki Wooten M.D.;  Location: [facility];  Service: Orthopedics    DENTAL EXTRACTION(S)         Social History     Tobacco Use    Smoking status: Never    Smokeless tobacco: Never   Vaping Use    Vaping Use: Never used   Substance Use Topics    Alcohol use: Not Currently     Comment: seldon    Drug use: Never       Family History   Problem Relation Age of Onset    Arthritis Mother     Cancer Mother         uterine    Heart Disease Mother     Hypertension Mother     Drug abuse Mother     Alcohol abuse Father     Scoliosis Sister     Thyroid Maternal Grandmother     Thyroid Sister         hashimoto       ROS: As in HPI, otherwise negative for chest pain, dyspnea, abdominal pain, dysuria, blood in stool, fever             Exam: /80   Pulse 92   Temp 36.6 °C (97.8 °F)   Resp 18   Ht 1.575 m (5' 2\")   Wt 110 kg (241 lb 11.2 oz)   SpO2 95%  Body mass index is 44.21 kg/m².    General: Alert, pleasant, well nourished, well developed female in NAD  HEENT: Normocephalic. Eyes conjunctiva clear lids without ptosis  Neck: Supple without bruit. Thyroid is not enlarged.  Pulmonary: Clear to ausculation.  Normal effort. No rales, ronchi, or wheezing.  Cardiovascular: Normal rate and rhythm without murmur.   Neurologic: Grossly nonfocal  Lymph: No cervical or supraclavicular lymph nodes are palpable  Skin: Warm and dry.   Musculoskeletal: Normal gait.   Psych: Normal mood and affect. Alert and oriented. Judgment and insight is " normal.    Component      Latest Ref Rng 4/11/2023 4/20/2023   Sodium      135 - 145 mmol/L 141     Potassium      3.6 - 5.5 mmol/L 4.0     Chloride      96 - 112 mmol/L 106     Co2      20 - 33 mmol/L 23     Anion Gap      7.0 - 16.0  12.0     Glucose      65 - 99 mg/dL 84     Bun      8 - 22 mg/dL 11     Creatinine      0.50 - 1.40 mg/dL 0.59     Calcium      8.5 - 10.5 mg/dL 8.8     AST(SGOT)      12 - 45 U/L 20     ALT(SGPT)      2 - 50 U/L 24     Alkaline Phosphatase      30 - 99 U/L 79     Total Bilirubin      0.1 - 1.5 mg/dL 0.5     Albumin      3.2 - 4.9 g/dL 4.0     Total Protein      6.0 - 8.2 g/dL 7.0     Globulin      1.9 - 3.5 g/dL 3.0     A-G Ratio      g/dL 1.3     WBC      4.8 - 10.8 K/uL 6.9     RBC      4.20 - 5.40 M/uL 5.00     Hemoglobin      12.0 - 16.0 g/dL 14.8     Hematocrit      37.0 - 47.0 % 43.7     MCV      81.4 - 97.8 fL 87.4     MCH      27.0 - 33.0 pg 29.6     MCHC      33.6 - 35.0 g/dL 33.9     RDW      35.9 - 50.0 fL 40.1     Platelet Count      164 - 446 K/uL 234     MPV      9.0 - 12.9 fL 11.6     Cholesterol,Tot      100 - 199 mg/dL 205 (H)     Triglycerides      0 - 149 mg/dL 94     HDL      >=40 mg/dL 50     LDL      <100 mg/dL 136 (H)     POC Urine Pregnancy Test  Negative    Internal Control Positive  Positive    Internal Control Negative  Negative    GFR (CKD-EPI)      >60 mL/min/1.73 m 2 121     TSH      0.380 - 5.330 uIU/mL 2.720     25-Hydroxy Vitamin D 25      30 - 100 ng/mL 26 (L)     Fasting Status Fasting     Correct Calcium      8.5 - 10.5 mg/dL 8.8          Please note that this dictation was created using voice recognition software. I have made every reasonable attempt to correct obvious errors, but I expect that there are errors of grammar and possibly content that I did not discover before finalizing the note.      Assessment/Plan  1. Morbid obesity with BMI of 40.0-44.9, adult (HCC)  Continues to struggle with obesity.  We will start phentermine once a day.   Instructions and side effects reviewed with patient.  She will discontinue if she has severe nausea or tachycardia.  Point-of-care pregnancy test negative.  Will use barrier method for contraception while taking phentermine.  Understands she will need to be off of phentermine for a month prior to trying to get pregnant.  Reviewed with patient that phentermine is a controlled substance.  Went over controlled substance agreement and patient signed.   reviewed.    - phentermine 15 MG capsule; Take 1 Capsule by mouth every morning for 60 days.  Dispense: 30 Capsule; Refill: 1  - Controlled Substance Treatment Agreement    2. High risk medication use    - POCT Pregnancy    3. Infertility, female    4. Encounter for monitoring stimulant therapy    - EKG - Clinic Performed    5. Vitamin D insufficiency  Advised on vitamin D3 2000 units daily.    6. Dyslipidemia  Reviewed results with patient.  Encouraged weight loss, routine aerobic exercise as tolerated, low-fat diet.    Patient will return to clinic in 6 to 8 weeks for follow-up on weight management and for medication refill or sooner if needed.

## 2023-04-20 NOTE — ASSESSMENT & PLAN NOTE
Has had trouble conceiving for the last year.  Reportedly gynecologist told her that losing weight would help.  LMP 2 weeks ago.  Patient here for weight loss management.

## 2023-04-20 NOTE — ASSESSMENT & PLAN NOTE
HPI 4/7/23: Patient continues to struggle with obesity.  Difficulty losing weight.  Has tried multiple diets.  Currently on Noom.  Exercising by walking her dogs 2-3 times a week.  On her feet all day at work.  Wonders if there is a medication that can help with weight loss.    Today's HPI: No changes since last appointment.  Continues to do Noom diet, exercise by walking her dogs.  Interested in losing weight with help of phentermine medication.  Hoping weight loss will help with conception.  Please see additional notes.  Took phentermine over 10 years ago, denies adverse effects.

## 2023-04-21 NOTE — ASSESSMENT & PLAN NOTE
Component      Latest Ref Rng 4/11/2023   Cholesterol,Tot      100 - 199 mg/dL 205 (H)    Triglycerides      0 - 149 mg/dL 94    HDL      >=40 mg/dL 50    LDL      <100 mg/dL 136 (H)       (H) High

## 2023-06-08 ENCOUNTER — OFFICE VISIT (OUTPATIENT)
Dept: MEDICAL GROUP | Facility: PHYSICIAN GROUP | Age: 33
End: 2023-06-08
Payer: COMMERCIAL

## 2023-06-08 VITALS
HEART RATE: 78 BPM | SYSTOLIC BLOOD PRESSURE: 110 MMHG | TEMPERATURE: 96.8 F | OXYGEN SATURATION: 99 % | WEIGHT: 230 LBS | BODY MASS INDEX: 42.33 KG/M2 | RESPIRATION RATE: 16 BRPM | HEIGHT: 62 IN | DIASTOLIC BLOOD PRESSURE: 80 MMHG

## 2023-06-08 DIAGNOSIS — E66.01 MORBID OBESITY WITH BMI OF 40.0-44.9, ADULT (HCC): ICD-10-CM

## 2023-06-08 PROCEDURE — 3074F SYST BP LT 130 MM HG: CPT | Performed by: NURSE PRACTITIONER

## 2023-06-08 PROCEDURE — 99213 OFFICE O/P EST LOW 20 MIN: CPT | Performed by: NURSE PRACTITIONER

## 2023-06-08 PROCEDURE — 3079F DIAST BP 80-89 MM HG: CPT | Performed by: NURSE PRACTITIONER

## 2023-06-08 RX ORDER — PHENTERMINE HYDROCHLORIDE 15 MG/1
15 CAPSULE ORAL EVERY MORNING
Qty: 30 CAPSULE | Refills: 2 | Status: SHIPPED | OUTPATIENT
Start: 2023-06-18 | End: 2023-09-16

## 2023-06-08 ASSESSMENT — FIBROSIS 4 INDEX: FIB4 SCORE: 0.58

## 2023-06-08 NOTE — ASSESSMENT & PLAN NOTE
Chronic in nature.  Has been working on weight loss and started phentermine approximately 6 weeks ago.  Has been able to lose 11 pounds.  Has not started exercising, just working on portion control.  Denies adverse effects to phentermine.  No insomnia or tachycardia.

## 2023-06-08 NOTE — PROGRESS NOTES
CC: Follow-up on weight management    HISTORY OF THE PRESENT ILLNESS: Patient is a 32 y.o. female. This pleasant patient is here today for evaluation and management of the following health problems.        Morbid obesity with BMI of 40.0-44.9, adult (HCC)  Chronic in nature.  Has been working on weight loss and started phentermine approximately 6 weeks ago.  Has been able to lose 11 pounds.  Has not started exercising, just working on portion control.  Denies adverse effects to phentermine.  No insomnia or tachycardia.      Allergies: Codeine and Vicodin [hydrocodone-acetaminophen]    Current Outpatient Medications Ordered in Epic   Medication Sig Dispense Refill    [START ON 6/18/2023] phentermine 15 MG capsule Take 1 Capsule by mouth every morning for 90 days. 30 Capsule 2    busPIRone (BUSPAR) 10 MG Tab tablet Take 10 mg by mouth 2 times a day.      valACYclovir (VALTREX) 500 MG Tab TAKE 1 TABLET BY MOUTH TWICE DAILY FOR  3  DAYS  IF  NEEDED  FOR  HERPES  FLARE 30 Tablet 3    montelukast (SINGULAIR) 10 MG Tab Take 1 Tablet by mouth every day. 90 Tablet 3    sertraline (ZOLOFT) 100 MG Tab Take 1 tablet by mouth once daily 90 Tablet 3     No current Epic-ordered facility-administered medications on file.       Past Medical History:   Diagnosis Date    Allergy     Anxiety     Depression     GERD (gastroesophageal reflux disease)     Head ache     Urinary tract infection        Past Surgical History:   Procedure Laterality Date    [place] NEUROPLASTY & OR TRANSPOS MEDIAN NRV CARPAL ALHAJI Right 11/4/2022    Procedure: RIGHT HAND ENDOSCOPIC CARPAL TUNNEL RELEASE;  Surgeon: Nikki Wooten M.D.;  Location: [facility];  Service: Orthopedics    DENTAL EXTRACTION(S)         Social History     Tobacco Use    Smoking status: Never    Smokeless tobacco: Never   Vaping Use    Vaping Use: Never used   Substance Use Topics    Alcohol use: Not Currently     Comment: seldon    Drug use: Never       Family  "History   Problem Relation Age of Onset    Arthritis Mother     Cancer Mother         uterine    Heart Disease Mother     Hypertension Mother     Drug abuse Mother     Alcohol abuse Father     Scoliosis Sister     Thyroid Maternal Grandmother     Thyroid Sister         hashimoto       ROS: As in HPI, otherwise negative for chest pain, dyspnea, abdominal pain, dysuria, blood in stool, fever         Exam: /80   Pulse 78   Temp 36 °C (96.8 °F) (Temporal)   Resp 16   Ht 1.575 m (5' 2\")   Wt 104 kg (230 lb)   SpO2 99%  Body mass index is 42.07 kg/m².    General: Alert, pleasant, well nourished, well developed female in NAD  HEENT: Normocephalic. Eyes conjunctiva clear lids without ptosis   Neck: Supple   Pulmonary: Clear to ausculation.  Normal effort. No rales, ronchi, or wheezing.  Cardiovascular: Normal rate and rhythm without murmur.   Neurologic: Grossly nonfocal  Skin: Warm and dry.    Musculoskeletal: Normal gait.   Psych: Normal mood and affect. Alert and oriented. Judgment and insight is normal.    Please note that this dictation was created using voice recognition software. I have made every reasonable attempt to correct obvious errors, but I expect that there are errors of grammar and possibly content that I did not discover before finalizing the note.      Assessment/Plan  1. Morbid obesity with BMI of 40.0-44.9, adult (HCC)  Patient able to lose weight with assistance of phentermine and portion control.  Tolerating phentermine.  Would like to continue.   reviewed.  Refill for phentermine sent to pharmacy.  - phentermine 15 MG capsule; Take 1 Capsule by mouth every morning for 90 days.  Dispense: 30 Capsule; Refill: 2      I explained to patient today that I will be leaving the practice.  Offered follow-up appointment as new to you patient with another provider.  Patient will consider and call to schedule.    "

## 2023-06-20 PROBLEM — G56.02 CARPAL TUNNEL SYNDROME, LEFT: Status: ACTIVE | Noted: 2023-06-20

## 2023-09-19 PROBLEM — M65.311 TRIGGER THUMB, RIGHT THUMB: Status: ACTIVE | Noted: 2023-09-19

## 2023-12-29 PROBLEM — E66.813 CLASS 3 SEVERE OBESITY DUE TO EXCESS CALORIES WITHOUT SERIOUS COMORBIDITY WITH BODY MASS INDEX (BMI) OF 45.0 TO 49.9 IN ADULT (HCC): Status: ACTIVE | Noted: 2021-01-06

## 2024-11-11 ENCOUNTER — OFFICE VISIT (OUTPATIENT)
Dept: URGENT CARE | Facility: PHYSICIAN GROUP | Age: 34
End: 2024-11-11
Payer: COMMERCIAL

## 2024-11-11 VITALS
HEART RATE: 110 BPM | TEMPERATURE: 97.5 F | RESPIRATION RATE: 16 BRPM | SYSTOLIC BLOOD PRESSURE: 132 MMHG | OXYGEN SATURATION: 96 % | DIASTOLIC BLOOD PRESSURE: 88 MMHG

## 2024-11-11 DIAGNOSIS — J06.9 UPPER RESPIRATORY INFECTION, ACUTE: ICD-10-CM

## 2024-11-11 DIAGNOSIS — R12 HEART BURN: ICD-10-CM

## 2024-11-11 DIAGNOSIS — R00.0 TACHYCARDIA: ICD-10-CM

## 2024-11-11 LAB
FLUAV RNA SPEC QL NAA+PROBE: NEGATIVE
FLUBV RNA SPEC QL NAA+PROBE: NEGATIVE
RSV RNA SPEC QL NAA+PROBE: NEGATIVE
SARS-COV-2 RNA RESP QL NAA+PROBE: NEGATIVE

## 2024-11-11 PROCEDURE — 99213 OFFICE O/P EST LOW 20 MIN: CPT

## 2024-11-11 PROCEDURE — 3075F SYST BP GE 130 - 139MM HG: CPT

## 2024-11-11 PROCEDURE — 0241U POCT CEPHEID COV-2, FLU A/B, RSV - PCR: CPT

## 2024-11-11 PROCEDURE — 3079F DIAST BP 80-89 MM HG: CPT

## 2024-11-11 RX ORDER — ALBUTEROL SULFATE 90 UG/1
2 INHALANT RESPIRATORY (INHALATION) EVERY 6 HOURS PRN
Qty: 8.5 G | Refills: 0 | Status: SHIPPED | OUTPATIENT
Start: 2024-11-11

## 2024-11-11 RX ORDER — PANTOPRAZOLE SODIUM 20 MG/1
20 TABLET, DELAYED RELEASE ORAL DAILY
Qty: 30 TABLET | Refills: 1 | Status: SHIPPED | OUTPATIENT
Start: 2024-11-11

## 2024-11-11 RX ORDER — FLUTICASONE PROPIONATE 50 MCG
1 SPRAY, SUSPENSION (ML) NASAL DAILY
Qty: 16 G | Refills: 0 | Status: SHIPPED | OUTPATIENT
Start: 2024-11-11

## 2024-11-11 RX ORDER — BENZONATATE 100 MG/1
100 CAPSULE ORAL 3 TIMES DAILY PRN
Qty: 30 CAPSULE | Refills: 0 | Status: SHIPPED | OUTPATIENT
Start: 2024-11-11

## 2024-11-11 ASSESSMENT — ENCOUNTER SYMPTOMS
COUGH: 0
HEARTBURN: 1
SHORTNESS OF BREATH: 0
CHILLS: 0
FEVER: 0
ABDOMINAL PAIN: 1

## 2024-11-11 NOTE — PROGRESS NOTES
Chief Complaint   Patient presents with    Chest Pain     X3 days R upper chest pain, 7-8 pain, nausea today, heartburn        HISTORY OF PRESENT ILLNESS: Patient is a pleasant 34 y.o. female who presents to urgent care today right-sided chest pain for the last 3 days with some nausea and a feeling of heartburn.  Patient denies any shortness of breath, no numbness or tingling.  She does not have any major cardiac history.  She does not smoke.    Patient Active Problem List    Diagnosis Date Noted    Trigger thumb, right thumb 09/19/2023    Carpal tunnel syndrome, left 06/20/2023    Infertility, female 04/20/2023    Vitamin D insufficiency 04/20/2023    Dyslipidemia 04/20/2023    Jaw pain 04/07/2023    Trigger finger of right thumb 04/07/2023    Papanicolaou smear of cervix with positive high risk human papilloma virus (HPV) test 08/11/2021    Class 3 severe obesity due to excess calories without serious comorbidity with body mass index (BMI) of 45.0 to 49.9 in adult ([facility]ly [facility]) 01/06/2021    Episode of recurrent major depressive disorder (Former[facility]) 01/06/2021    Bilateral carpal tunnel syndrome 01/06/2021    Seasonal allergies 09/18/2020    Genital herpes 09/18/2020    Anxiety 09/18/2020    Enlarged thyroid 09/18/2020       Allergies:Codeine and Vicodin [hydrocodone-acetaminophen]    Current Outpatient Medications Ordered in Epic   Medication Sig Dispense Refill    fluticasone (FLONASE) 50 MCG/ACT nasal spray Administer 1 Spray into affected nostril(S) every day. 16 g 0    albuterol 108 (90 Base) MCG/ACT Aero Soln inhalation aerosol Inhale 2 Puffs every 6 hours as needed for Shortness of Breath. 8.5 g 0    benzonatate (TESSALON) 100 MG Cap Take 1 Capsule by mouth 3 times a day as needed for Cough. 30 Capsule 0    pantoprazole (PROTONIX) 20 MG tablet Take 1 Tablet by mouth every day. 30 Tablet 1    valACYclovir (VALTREX) 500 MG Tab TAKE 1 TABLET BY MOUTH TWICE DAILY FOR  3  DAYS  IF  NEEDED  FOR  HERPES  FLARE 30 Tablet 3     sertraline (ZOLOFT) 100 MG Tab Take 1 tablet by mouth once daily 90 Tablet 3    ondansetron (ZOFRAN ODT) 4 MG TABLET DISPERSIBLE Take 1 Tablet by mouth every 6 hours as needed for Nausea/Vomiting. (Patient not taking: Reported on 11/11/2024) 10 Tablet 0    Vitamin D, Cholecalciferol, (CHOLECALCIFEROL) 25 MCG (1000 UT) Tab Take 1,000 Units by mouth every day. (Patient not taking: Reported on 11/11/2024)      busPIRone (BUSPAR) 10 MG Tab tablet Take 10 mg by mouth 2 times a day.      montelukast (SINGULAIR) 10 MG Tab Take 1 Tablet by mouth every day. (Patient not taking: Reported on 11/11/2024) 90 Tablet 3     No current Epic-ordered facility-administered medications on file.       Past Medical History:   Diagnosis Date    Allergy     Anxiety     Depression     GERD (gastroesophageal reflux disease)     Head ache     Urinary tract infection        Social History     Tobacco Use    Smoking status: Never    Smokeless tobacco: Never   Vaping Use    Vaping status: Never Used   Substance Use Topics    Alcohol use: Not Currently     Comment: seldon    Drug use: Never       Family Status   Relation Name Status    Mo  Alive    Fa  Alive    Sis  Alive    MGMo  (Not Specified)    Sis  Alive   No partnership data on file     Family History   Problem Relation Age of Onset    Arthritis Mother     Cancer Mother         uterine    Heart Disease Mother     Hypertension Mother     Drug abuse Mother     Alcohol abuse Father     Scoliosis Sister     Thyroid Maternal Grandmother     Thyroid Sister         hashimoto       Review of Systems   Constitutional:  Negative for chills, fever and malaise/fatigue.   Respiratory:  Negative for cough and shortness of breath.    Cardiovascular:  Positive for chest pain.   Gastrointestinal:  Positive for abdominal pain and heartburn.       Exam:  /88   Pulse (!) 110   Temp 36.4 °C (97.5 °F)   Resp 16   SpO2 96%   Physical Exam  Vitals reviewed.   Constitutional:       Appearance: Normal  appearance. She is obese. She is not toxic-appearing.   HENT:      Head: Normocephalic.      Right Ear: Tympanic membrane, ear canal and external ear normal. There is no impacted cerumen.      Left Ear: Tympanic membrane, ear canal and external ear normal. There is no impacted cerumen.      Nose: No congestion or rhinorrhea.      Mouth/Throat:      Mouth: Mucous membranes are moist.      Pharynx: Oropharynx is clear. No oropharyngeal exudate or posterior oropharyngeal erythema.   Eyes:      General:         Right eye: No discharge.         Left eye: No discharge.      Extraocular Movements: Extraocular movements intact.      Conjunctiva/sclera: Conjunctivae normal.      Pupils: Pupils are equal, round, and reactive to light.   Cardiovascular:      Rate and Rhythm: Regular rhythm. Tachycardia present.      Pulses: Normal pulses.      Heart sounds: Normal heart sounds. No murmur heard.  Pulmonary:      Effort: Pulmonary effort is normal. No respiratory distress.      Breath sounds: Normal breath sounds. No stridor. No wheezing, rhonchi or rales.   Abdominal:      General: Abdomen is flat. Bowel sounds are normal.      Palpations: Abdomen is soft. There is no mass.      Tenderness: There is no abdominal tenderness. There is no right CVA tenderness, left CVA tenderness, guarding or rebound.   Musculoskeletal:         General: Normal range of motion.      Cervical back: Normal range of motion and neck supple. No tenderness.      Right lower leg: No edema.      Left lower leg: No edema.   Skin:     General: Skin is warm and dry.      Capillary Refill: Capillary refill takes less than 2 seconds.      Findings: No bruising, erythema, lesion or rash.   Neurological:      General: No focal deficit present.      Mental Status: She is alert.      Sensory: No sensory deficit.      Motor: No weakness.      Coordination: Coordination normal.      Gait: Gait normal.   Psychiatric:         Mood and Affect: Mood normal.          Behavior: Behavior normal.         Thought Content: Thought content normal.         Judgment: Judgment normal.         Assessment/Plan:  1. Upper respiratory infection, acute  - POCT CoV-2, Flu A/B, RSV by PCR  - fluticasone (FLONASE) 50 MCG/ACT nasal spray; Administer 1 Spray into affected nostril(S) every day.  Dispense: 16 g; Refill: 0  - albuterol 108 (90 Base) MCG/ACT Aero Soln inhalation aerosol; Inhale 2 Puffs every 6 hours as needed for Shortness of Breath.  Dispense: 8.5 g; Refill: 0  - benzonatate (TESSALON) 100 MG Cap; Take 1 Capsule by mouth 3 times a day as needed for Cough.  Dispense: 30 Capsule; Refill: 0    2. Tachycardia    3. Heart burn  - pantoprazole (PROTONIX) 20 MG tablet; Take 1 Tablet by mouth every day.  Dispense: 30 Tablet; Refill: 1    Based on physical exam along with review of systems I did go ahead and prescribe Protonix, Flonase and Tessalon Perles for her cold-like symptoms, POCT viral swabbing was complete as well.  Patient's physical exam appears benign however I did advise that should her chest pain continue to get worse, she develops any shortness of breath, numbness tingling, nausea vomiting or any other adverse signs or symptoms to please seek further evaluation with the emergency room.    Supportive care, differential diagnoses, and indications for immediate follow-up discussed with patient.   Pathogenesis of diagnosis discussed including typical length and natural progression.   Instructed to return to clinic or nearest emergency department for any change in condition, further concerns, or worsening of symptoms.  Patient states understanding of the plan of care and discharge instructions.  Instructed to make an appointment, for follow up, with primary care provider.    Please note that this dictation was created using voice recognition software. I have made every reasonable attempt to correct obvious errors, but I expect that there are errors of grammar and possibly content  that I did not discover before finalizing the note.      Maryjane Vallejo APRN

## 2024-11-11 NOTE — LETTER
[facility] URGENT CARE [address] [place]  [facility] [address]     November 11, 2024    Patient: Christina HAWK   YOB: 1990   Date of Visit: 11/11/2024       To Whom It May Concern:    Christina HAWK was seen and treated in our department on 11/11/2024. Please excuse 11/12.      Sincerely,     YOAN Akbar.

## 2024-11-13 ENCOUNTER — OFFICE VISIT (OUTPATIENT)
Dept: URGENT CARE | Facility: PHYSICIAN GROUP | Age: 34
End: 2024-11-13
Payer: COMMERCIAL

## 2024-11-13 VITALS
OXYGEN SATURATION: 98 % | HEIGHT: 62 IN | BODY MASS INDEX: 45.27 KG/M2 | RESPIRATION RATE: 18 BRPM | SYSTOLIC BLOOD PRESSURE: 130 MMHG | TEMPERATURE: 98.1 F | DIASTOLIC BLOOD PRESSURE: 80 MMHG | WEIGHT: 246 LBS | HEART RATE: 104 BPM

## 2024-11-13 DIAGNOSIS — B34.9 VIRAL ILLNESS: ICD-10-CM

## 2024-11-13 DIAGNOSIS — J02.9 ACUTE PHARYNGITIS, UNSPECIFIED ETIOLOGY: ICD-10-CM

## 2024-11-13 LAB — S PYO DNA SPEC NAA+PROBE: NOT DETECTED

## 2024-11-13 PROCEDURE — 99213 OFFICE O/P EST LOW 20 MIN: CPT | Performed by: NURSE PRACTITIONER

## 2024-11-13 PROCEDURE — 3075F SYST BP GE 130 - 139MM HG: CPT | Performed by: NURSE PRACTITIONER

## 2024-11-13 PROCEDURE — 3079F DIAST BP 80-89 MM HG: CPT | Performed by: NURSE PRACTITIONER

## 2024-11-13 PROCEDURE — 87651 STREP A DNA AMP PROBE: CPT | Performed by: NURSE PRACTITIONER

## 2024-11-13 ASSESSMENT — FIBROSIS 4 INDEX: FIB4 SCORE: 0.61

## 2024-11-13 NOTE — PROGRESS NOTES
"Subjective:   Christina HAWK is a 34 y.o. female who presents for Follow-Up (Cough, sore throat, losing voice, congestion, fatigue. Not improving since monday)    Patient is a 34-year-old female presenting clinic today for 5-day history of ongoing cough, sore throat, fatigue, nasal congestion, hot and cold chills, and hoarse voice.  Patient denies any chest pain, fever, nausea, vomiting, or diarrhea.  Patient was seen on 11/11/2024 and did test negative for COVID, influenza, and RSV.  Patient was prescribed at that time albuterol, Tessalon, Flonase, and pantoprazole however was unaware that these were sent to the pharmacy for her.  Patient has been taking over-the-counter DayQuil and Tylenol.    Medications, Allergies, and current problem list reviewed today in Epic.     Objective:     /80   Pulse (!) 104   Temp 36.7 °C (98.1 °F) (Temporal)   Resp 18   Ht 1.575 m (5' 2\")   Wt 112 kg (246 lb)   SpO2 98%     Physical Exam  Vitals reviewed.   Constitutional:       General: She is not in acute distress.     Appearance: Normal appearance. She is ill-appearing. She is not toxic-appearing.   HENT:      Head: Normocephalic.      Right Ear: Tympanic membrane, ear canal and external ear normal.      Left Ear: Tympanic membrane, ear canal and external ear normal.      Nose: Rhinorrhea present. No congestion.      Mouth/Throat:      Mouth: Mucous membranes are moist.      Pharynx: Posterior oropharyngeal erythema present. No oropharyngeal exudate.   Eyes:      Extraocular Movements: Extraocular movements intact.      Conjunctiva/sclera: Conjunctivae normal.      Pupils: Pupils are equal, round, and reactive to light.   Cardiovascular:      Rate and Rhythm: Normal rate and regular rhythm.   Pulmonary:      Effort: Pulmonary effort is normal. No respiratory distress.      Breath sounds: No stridor. No wheezing, rhonchi or rales.   Musculoskeletal:         General: Normal range of motion.      Cervical back: Normal " range of motion and neck supple.   Lymphadenopathy:      Cervical: No cervical adenopathy.   Skin:     General: Skin is warm and dry.      Capillary Refill: Capillary refill takes less than 2 seconds.      Findings: No lesion or rash.   Neurological:      Mental Status: She is alert and oriented to person, place, and time.   Psychiatric:         Mood and Affect: Mood normal.         Behavior: Behavior normal.         Thought Content: Thought content normal.         Judgment: Judgment normal.       Results for orders placed or performed in visit on 11/13/24   POCT CEPHEID GROUP A STREP - PCR    Collection Time: 11/13/24 11:36 AM   Result Value Ref Range    POC Group A Strep, PCR Not Detected Not Detected, Invalid         Assessment/Plan:     Diagnosis and associated orders:     1. Viral illness        2. Acute pharyngitis, unspecified etiology  POCT CEPHEID GROUP A STREP - PCR         Comments/MDM:     POCT strep test was negative.  Did discuss viral screening however due to duration of symptoms with shared decision making patient politely declined.  HPI physical exam finds are consistent with viral illness.  Lung sounds are clear throughout.  Low suspicion at this time for pneumonia.  No signs of secondary bacterial infection.  Vital signs are all within normal range.   Did advise patient on conservative measures for management of symptoms.  Patient is agreeable to pursue adequate rest, adequate hydration, and saltwater gargle.  Over-the-counter analgesia and antipyretics on a p.r.n. basis as needed for pain and fever.  Did discuss over-the-counter cough medications and to  follow manufactures dosing and safety guidelines.    Recommended following up in clinic if symptoms acutely worsen or if symptoms/fever persist past 7 to 10 days.  Patient was involved with shared decision-making throughout the exam today and verbalizes understanding regards to plan of care, discharge instructions, and follow-up          Differential diagnosis, natural history, supportive care, and indications for immediate follow-up discussed.    Advised the patient to follow-up with the primary care physician for recheck, reevaluation, and consideration of further management.    I personally reviewed prior external notes and test results pertinent to today's visit as well as additional imaging and testing completed in clinic today.     Please note that this dictation was created using voice recognition software. I have made a reasonable attempt to correct obvious errors, but I expect that there are errors of grammar and possibly content that I did not discover before finalizing the note.

## 2024-11-13 NOTE — LETTER
[facility] URGENT CARE [place]  [address] [place]  [facility] [address]     November 13, 2024    Patient: Christina HAWK   YOB: 1990   Date of Visit: 11/13/2024       To Whom It May Concern:    Christina HAWK was seen and treated in our department on 11/13/2024.  Will need to be excused from school/work until 11/15/2024 when she may return as long she is 24 hours fever free.    Sincerely,     YOAN Man.

## 2025-01-10 ENCOUNTER — OFFICE VISIT (OUTPATIENT)
Dept: URGENT CARE | Facility: PHYSICIAN GROUP | Age: 35
End: 2025-01-10
Payer: COMMERCIAL

## 2025-01-10 VITALS
TEMPERATURE: 97 F | HEIGHT: 62 IN | OXYGEN SATURATION: 95 % | RESPIRATION RATE: 16 BRPM | BODY MASS INDEX: 46.96 KG/M2 | WEIGHT: 255.2 LBS | SYSTOLIC BLOOD PRESSURE: 110 MMHG | HEART RATE: 87 BPM | DIASTOLIC BLOOD PRESSURE: 80 MMHG

## 2025-01-10 DIAGNOSIS — K62.9 PERIANAL LESION: ICD-10-CM

## 2025-01-10 PROCEDURE — 99213 OFFICE O/P EST LOW 20 MIN: CPT | Performed by: NURSE PRACTITIONER

## 2025-01-10 PROCEDURE — 3079F DIAST BP 80-89 MM HG: CPT | Performed by: NURSE PRACTITIONER

## 2025-01-10 PROCEDURE — 3074F SYST BP LT 130 MM HG: CPT | Performed by: NURSE PRACTITIONER

## 2025-01-10 PROCEDURE — 1125F AMNT PAIN NOTED PAIN PRSNT: CPT | Performed by: NURSE PRACTITIONER

## 2025-01-10 RX ORDER — MUPIROCIN 20 MG/G
OINTMENT TOPICAL
Qty: 22 G | Refills: 3 | Status: SHIPPED | OUTPATIENT
Start: 2025-01-10

## 2025-01-10 ASSESSMENT — FIBROSIS 4 INDEX: FIB4 SCORE: 0.61

## 2025-01-10 ASSESSMENT — PAIN SCALES - GENERAL: PAINLEVEL_OUTOF10: 3=SLIGHT PAIN

## 2025-01-10 NOTE — PROGRESS NOTES
"Subjective:   Christina HAWK is a 34 y.o. female who presents for Pain (Pain and intichiness in perineal area. Does have hx of herpes but doesn't feel the same symptoms. Felt like an opening in the area. )    Patient is a 34-year-old female presenting clinic today reporting a sore on her peritoneal area between her vaginal canal and her rectum.  She states the sores been there approximately 3 weeks.  It is unchanged and is not healing.  Patient does report some discomfort but it is not extremely painful.  Patient states that it does not really burn or tingle.  Patient does have history of HSV-2, did start valacyclovir on onset of symptoms however did not help.  Patient denies any fevers, chills, or normal vaginal discharge.    Medications, Allergies, and current problem list reviewed today in Epic.     Objective:     /80   Pulse 87   Temp 36.1 °C (97 °F) (Temporal)   Resp 16   Ht 1.575 m (5' 2\")   Wt 116 kg (255 lb 3.2 oz)   SpO2 95%     Physical Exam  Vitals reviewed.   Constitutional:       Appearance: Normal appearance.   HENT:      Head: Normocephalic.      Nose: Nose normal.      Mouth/Throat:      Mouth: Mucous membranes are moist.   Eyes:      Extraocular Movements: Extraocular movements intact.      Conjunctiva/sclera: Conjunctivae normal.      Pupils: Pupils are equal, round, and reactive to light.   Cardiovascular:      Rate and Rhythm: Normal rate.   Pulmonary:      Effort: Pulmonary effort is normal.   Genitourinary:         Comments: Small circular perianal lesion with slightly erythematous red borders and central maceration.  Patient elicits tenderness to palpation.  No discharge or drainage.  Negative for surrounding vesicles or lesions.  Musculoskeletal:         General: Normal range of motion.      Cervical back: Normal range of motion and neck supple.   Skin:     General: Skin is warm and dry.   Neurological:      Mental Status: She is alert and oriented to person, place, and time. "   Psychiatric:         Mood and Affect: Mood normal.         Behavior: Behavior normal.         Thought Content: Thought content normal.         Judgment: Judgment normal.         Assessment/Plan:     Diagnosis and associated orders:     1. Perianal lesion  mupirocin (BACTROBAN) 2 % Ointment         Comments/MDM:     This acute condition.  Discussed with patient differential diagnoses, most likely HSV 2 however due to no response to antiviral medications and duration of 3 weeks we will go ahead and place on Bactroban ointment.  Recommended patient keep the area clean and dry and prevent friction rub.  Discussed barrier creams/ointments.  Recommended representing clinic if symptoms are improving in 7 to 10 days or sooner if they acutely worsen.  Patient was involved with shared decision-making throughout the exam today and verbalizes understanding regards to plan of care, discharge instructions, and follow-up         Differential diagnosis, natural history, supportive care, and indications for immediate follow-up discussed.    Advised the patient to follow-up with the primary care physician for recheck, reevaluation, and consideration of further management.    I personally reviewed prior external notes and test results pertinent to today's visit as well as additional imaging and testing completed in clinic today.     Please note that this dictation was created using voice recognition software. I have made a reasonable attempt to correct obvious errors, but I expect that there are errors of grammar and possibly content that I did not discover before finalizing the note.

## 2025-01-10 NOTE — LETTER
[facility] URGENT CARE [address] [address]     January 10, 2025    Patient: Christina HAWK   YOB: 1990   Date of Visit: 1/10/2025       To Whom It May Concern:    Christina HAWK was seen and treated in our department on 1/10/2025.  She will need to be excuse from work today and may return back to her regular scheduled shifts tomorrow.    Sincerely,     YOAN Man.